# Patient Record
Sex: MALE | Race: BLACK OR AFRICAN AMERICAN | NOT HISPANIC OR LATINO | Employment: OTHER | URBAN - METROPOLITAN AREA
[De-identification: names, ages, dates, MRNs, and addresses within clinical notes are randomized per-mention and may not be internally consistent; named-entity substitution may affect disease eponyms.]

---

## 2020-01-18 ENCOUNTER — APPOINTMENT (INPATIENT)
Dept: RADIOLOGY | Facility: HOSPITAL | Age: 70
DRG: 305 | End: 2020-01-18
Payer: COMMERCIAL

## 2020-01-18 ENCOUNTER — HOSPITAL ENCOUNTER (EMERGENCY)
Facility: HOSPITAL | Age: 70
End: 2020-01-18
Attending: EMERGENCY MEDICINE
Payer: COMMERCIAL

## 2020-01-18 ENCOUNTER — HOSPITAL ENCOUNTER (INPATIENT)
Facility: HOSPITAL | Age: 70
LOS: 1 days | Discharge: HOME | DRG: 305 | End: 2020-01-19
Attending: INTERNAL MEDICINE | Admitting: GENERAL PRACTICE
Payer: COMMERCIAL

## 2020-01-18 VITALS
HEART RATE: 57 BPM | SYSTOLIC BLOOD PRESSURE: 175 MMHG | OXYGEN SATURATION: 98 % | WEIGHT: 251.77 LBS | TEMPERATURE: 97.7 F | DIASTOLIC BLOOD PRESSURE: 93 MMHG | RESPIRATION RATE: 17 BRPM

## 2020-01-18 DIAGNOSIS — J45.40 MODERATE PERSISTENT ASTHMA WITHOUT COMPLICATION: ICD-10-CM

## 2020-01-18 DIAGNOSIS — L30.9 ECZEMA, UNSPECIFIED TYPE: ICD-10-CM

## 2020-01-18 DIAGNOSIS — E11.9 DIABETES (HCC): ICD-10-CM

## 2020-01-18 DIAGNOSIS — N28.9 RENAL INSUFFICIENCY: ICD-10-CM

## 2020-01-18 DIAGNOSIS — M25.511 ACUTE PAIN OF RIGHT SHOULDER: ICD-10-CM

## 2020-01-18 DIAGNOSIS — I16.0 HYPERTENSIVE URGENCY: ICD-10-CM

## 2020-01-18 DIAGNOSIS — N18.30 CKD (CHRONIC KIDNEY DISEASE) STAGE 3, GFR 30-59 ML/MIN (HCC): ICD-10-CM

## 2020-01-18 DIAGNOSIS — R04.0 EPISTAXIS: Primary | ICD-10-CM

## 2020-01-18 DIAGNOSIS — I10 HYPERTENSION: ICD-10-CM

## 2020-01-18 DIAGNOSIS — E78.5 HYPERLIPIDEMIA: ICD-10-CM

## 2020-01-18 DIAGNOSIS — Z72.0 TOBACCO ABUSE: ICD-10-CM

## 2020-01-18 DIAGNOSIS — R04.0 POSTERIOR EPISTAXIS: ICD-10-CM

## 2020-01-18 DIAGNOSIS — R04.0 LEFT-SIDED EPISTAXIS: Primary | ICD-10-CM

## 2020-01-18 PROBLEM — J45.909 ASTHMA: Status: ACTIVE | Noted: 2020-01-18

## 2020-01-18 LAB
ABO GROUP BLD: NORMAL
ALBUMIN SERPL BCP-MCNC: 3.3 G/DL (ref 3.5–5)
ALP SERPL-CCNC: 77 U/L (ref 46–116)
ALT SERPL W P-5'-P-CCNC: 22 U/L (ref 12–78)
ANION GAP SERPL CALCULATED.3IONS-SCNC: 11 MMOL/L (ref 4–13)
APTT PPP: 35 SECONDS (ref 23–37)
AST SERPL W P-5'-P-CCNC: 24 U/L (ref 5–45)
BASOPHILS # BLD AUTO: 0.04 THOUSANDS/ΜL (ref 0–0.1)
BASOPHILS NFR BLD AUTO: 1 % (ref 0–1)
BILIRUB SERPL-MCNC: 0.3 MG/DL (ref 0.2–1)
BLD GP AB SCN SERPL QL: NEGATIVE
BUN SERPL-MCNC: 24 MG/DL (ref 5–25)
CALCIUM SERPL-MCNC: 8.5 MG/DL (ref 8.3–10.1)
CHLORIDE SERPL-SCNC: 107 MMOL/L (ref 100–108)
CO2 SERPL-SCNC: 24 MMOL/L (ref 21–32)
CREAT SERPL-MCNC: 1.77 MG/DL (ref 0.6–1.3)
EOSINOPHIL # BLD AUTO: 0.17 THOUSAND/ΜL (ref 0–0.61)
EOSINOPHIL NFR BLD AUTO: 4 % (ref 0–6)
ERYTHROCYTE [DISTWIDTH] IN BLOOD BY AUTOMATED COUNT: 13.6 % (ref 11.6–15.1)
GFR SERPL CREATININE-BSD FRML MDRD: 45 ML/MIN/1.73SQ M
GLUCOSE SERPL-MCNC: 109 MG/DL (ref 65–140)
GLUCOSE SERPL-MCNC: 117 MG/DL (ref 65–140)
GLUCOSE SERPL-MCNC: 83 MG/DL (ref 65–140)
HCT VFR BLD AUTO: 36.2 % (ref 36.5–49.3)
HGB BLD-MCNC: 11.7 G/DL (ref 12–17)
IMM GRANULOCYTES # BLD AUTO: 0.01 THOUSAND/UL (ref 0–0.2)
IMM GRANULOCYTES NFR BLD AUTO: 0 % (ref 0–2)
INR PPP: 1.16 (ref 0.84–1.19)
LYMPHOCYTES # BLD AUTO: 1.11 THOUSANDS/ΜL (ref 0.6–4.47)
LYMPHOCYTES NFR BLD AUTO: 23 % (ref 14–44)
MCH RBC QN AUTO: 27.9 PG (ref 26.8–34.3)
MCHC RBC AUTO-ENTMCNC: 32.3 G/DL (ref 31.4–37.4)
MCV RBC AUTO: 86 FL (ref 82–98)
MONOCYTES # BLD AUTO: 0.42 THOUSAND/ΜL (ref 0.17–1.22)
MONOCYTES NFR BLD AUTO: 9 % (ref 4–12)
NEUTROPHILS # BLD AUTO: 3.14 THOUSANDS/ΜL (ref 1.85–7.62)
NEUTS SEG NFR BLD AUTO: 63 % (ref 43–75)
NRBC BLD AUTO-RTO: 0 /100 WBCS
PLATELET # BLD AUTO: 246 THOUSANDS/UL (ref 149–390)
PMV BLD AUTO: 9.8 FL (ref 8.9–12.7)
POTASSIUM SERPL-SCNC: 3.8 MMOL/L (ref 3.5–5.3)
PROT SERPL-MCNC: 7.1 G/DL (ref 6.4–8.2)
PROTHROMBIN TIME: 14.9 SECONDS (ref 11.6–14.5)
RBC # BLD AUTO: 4.2 MILLION/UL (ref 3.88–5.62)
RH BLD: POSITIVE
SODIUM SERPL-SCNC: 142 MMOL/L (ref 136–145)
SPECIMEN EXPIRATION DATE: NORMAL
WBC # BLD AUTO: 4.89 THOUSAND/UL (ref 4.31–10.16)

## 2020-01-18 PROCEDURE — 99222 1ST HOSP IP/OBS MODERATE 55: CPT | Performed by: GENERAL PRACTICE

## 2020-01-18 PROCEDURE — 96375 TX/PRO/DX INJ NEW DRUG ADDON: CPT

## 2020-01-18 PROCEDURE — 96365 THER/PROPH/DIAG IV INF INIT: CPT

## 2020-01-18 PROCEDURE — 30903 CONTROL OF NOSEBLEED: CPT | Performed by: EMERGENCY MEDICINE

## 2020-01-18 PROCEDURE — 85610 PROTHROMBIN TIME: CPT | Performed by: EMERGENCY MEDICINE

## 2020-01-18 PROCEDURE — 73030 X-RAY EXAM OF SHOULDER: CPT

## 2020-01-18 PROCEDURE — 36415 COLL VENOUS BLD VENIPUNCTURE: CPT | Performed by: EMERGENCY MEDICINE

## 2020-01-18 PROCEDURE — 85730 THROMBOPLASTIN TIME PARTIAL: CPT | Performed by: EMERGENCY MEDICINE

## 2020-01-18 PROCEDURE — 94760 N-INVAS EAR/PLS OXIMETRY 1: CPT

## 2020-01-18 PROCEDURE — 80053 COMPREHEN METABOLIC PANEL: CPT | Performed by: EMERGENCY MEDICINE

## 2020-01-18 PROCEDURE — 99285 EMERGENCY DEPT VISIT HI MDM: CPT | Performed by: EMERGENCY MEDICINE

## 2020-01-18 PROCEDURE — 86901 BLOOD TYPING SEROLOGIC RH(D): CPT | Performed by: EMERGENCY MEDICINE

## 2020-01-18 PROCEDURE — 99284 EMERGENCY DEPT VISIT MOD MDM: CPT

## 2020-01-18 PROCEDURE — 86850 RBC ANTIBODY SCREEN: CPT | Performed by: EMERGENCY MEDICINE

## 2020-01-18 PROCEDURE — 86900 BLOOD TYPING SEROLOGIC ABO: CPT | Performed by: EMERGENCY MEDICINE

## 2020-01-18 PROCEDURE — 82948 REAGENT STRIP/BLOOD GLUCOSE: CPT

## 2020-01-18 PROCEDURE — 96361 HYDRATE IV INFUSION ADD-ON: CPT

## 2020-01-18 PROCEDURE — 85025 COMPLETE CBC W/AUTO DIFF WBC: CPT | Performed by: EMERGENCY MEDICINE

## 2020-01-18 RX ORDER — BUPROPION HYDROCHLORIDE 150 MG/1
150 TABLET, EXTENDED RELEASE ORAL 2 TIMES DAILY
COMMUNITY

## 2020-01-18 RX ORDER — BUPROPION HYDROCHLORIDE 150 MG/1
150 TABLET, EXTENDED RELEASE ORAL 2 TIMES DAILY
Status: DISCONTINUED | OUTPATIENT
Start: 2020-01-18 | End: 2020-01-19 | Stop reason: HOSPADM

## 2020-01-18 RX ORDER — AMOXICILLIN AND CLAVULANATE POTASSIUM 875; 125 MG/1; MG/1
1 TABLET, FILM COATED ORAL EVERY 12 HOURS SCHEDULED
Status: DISCONTINUED | OUTPATIENT
Start: 2020-01-18 | End: 2020-01-19 | Stop reason: HOSPADM

## 2020-01-18 RX ORDER — FLUTICASONE PROPIONATE 50 MCG
2 SPRAY, SUSPENSION (ML) NASAL DAILY
Status: DISCONTINUED | OUTPATIENT
Start: 2020-01-19 | End: 2020-01-19 | Stop reason: HOSPADM

## 2020-01-18 RX ORDER — TADALAFIL 20 MG/1
20 TABLET ORAL DAILY PRN
COMMUNITY

## 2020-01-18 RX ORDER — FLUTICASONE PROPIONATE 50 MCG
2 SPRAY, SUSPENSION (ML) NASAL DAILY
Status: DISCONTINUED | OUTPATIENT
Start: 2020-01-19 | End: 2020-01-18

## 2020-01-18 RX ORDER — CLONIDINE HYDROCHLORIDE 0.2 MG/1
0.2 TABLET ORAL EVERY 12 HOURS SCHEDULED
Status: DISCONTINUED | OUTPATIENT
Start: 2020-01-18 | End: 2020-01-19 | Stop reason: HOSPADM

## 2020-01-18 RX ORDER — PREDNISONE 20 MG/1
20 TABLET ORAL SEE ADMIN INSTRUCTIONS
COMMUNITY

## 2020-01-18 RX ORDER — HYDROCHLOROTHIAZIDE 25 MG/1
25 TABLET ORAL DAILY
Status: ON HOLD | COMMUNITY
Start: 2019-08-21 | End: 2020-01-19 | Stop reason: SDUPTHER

## 2020-01-18 RX ORDER — MOMETASONE FUROATE 50 UG/1
2 SPRAY, METERED NASAL DAILY
COMMUNITY

## 2020-01-18 RX ORDER — OXYCODONE HYDROCHLORIDE 5 MG/1
2.5 TABLET ORAL EVERY 4 HOURS PRN
Status: DISCONTINUED | OUTPATIENT
Start: 2020-01-18 | End: 2020-01-19 | Stop reason: HOSPADM

## 2020-01-18 RX ORDER — TRIAMCINOLONE ACETONIDE 1 MG/ML
LOTION TOPICAL 3 TIMES DAILY
COMMUNITY

## 2020-01-18 RX ORDER — PANTOPRAZOLE SODIUM 20 MG/1
20 TABLET, DELAYED RELEASE ORAL
Status: DISCONTINUED | OUTPATIENT
Start: 2020-01-18 | End: 2020-01-19 | Stop reason: HOSPADM

## 2020-01-18 RX ORDER — OMEPRAZOLE 20 MG/1
20 CAPSULE, DELAYED RELEASE ORAL 2 TIMES DAILY
COMMUNITY

## 2020-01-18 RX ORDER — ALBUTEROL SULFATE 90 UG/1
2 AEROSOL, METERED RESPIRATORY (INHALATION) 4 TIMES DAILY
COMMUNITY

## 2020-01-18 RX ORDER — CEFAZOLIN SODIUM 2 G/50ML
2000 SOLUTION INTRAVENOUS ONCE
Status: COMPLETED | OUTPATIENT
Start: 2020-01-18 | End: 2020-01-18

## 2020-01-18 RX ORDER — FLUTICASONE FUROATE AND VILANTEROL 200; 25 UG/1; UG/1
1 POWDER RESPIRATORY (INHALATION)
Status: DISCONTINUED | OUTPATIENT
Start: 2020-01-19 | End: 2020-01-19 | Stop reason: HOSPADM

## 2020-01-18 RX ORDER — BACITRACIN, NEOMYCIN, POLYMYXIN B 400; 3.5; 5 [USP'U]/G; MG/G; [USP'U]/G
1 OINTMENT TOPICAL ONCE
Status: COMPLETED | OUTPATIENT
Start: 2020-01-18 | End: 2020-01-18

## 2020-01-18 RX ORDER — SIMVASTATIN 20 MG
20 TABLET ORAL
COMMUNITY
Start: 2019-08-21

## 2020-01-18 RX ORDER — MONTELUKAST SODIUM 10 MG/1
10 TABLET ORAL
Status: DISCONTINUED | OUTPATIENT
Start: 2020-01-18 | End: 2020-01-19 | Stop reason: HOSPADM

## 2020-01-18 RX ORDER — POTASSIUM CHLORIDE 750 MG/1
10 TABLET, EXTENDED RELEASE ORAL DAILY
Status: DISCONTINUED | OUTPATIENT
Start: 2020-01-18 | End: 2020-01-19 | Stop reason: HOSPADM

## 2020-01-18 RX ORDER — OXYMETAZOLINE HYDROCHLORIDE 0.05 G/100ML
2 SPRAY NASAL ONCE
Status: DISCONTINUED | OUTPATIENT
Start: 2020-01-18 | End: 2020-01-18

## 2020-01-18 RX ORDER — ONDANSETRON 2 MG/ML
4 INJECTION INTRAMUSCULAR; INTRAVENOUS ONCE
Status: COMPLETED | OUTPATIENT
Start: 2020-01-18 | End: 2020-01-18

## 2020-01-18 RX ORDER — PRAVASTATIN SODIUM 40 MG
40 TABLET ORAL
Status: DISCONTINUED | OUTPATIENT
Start: 2020-01-18 | End: 2020-01-19 | Stop reason: HOSPADM

## 2020-01-18 RX ORDER — METHOCARBAMOL 500 MG/1
500 TABLET, FILM COATED ORAL EVERY 6 HOURS SCHEDULED
Status: DISCONTINUED | OUTPATIENT
Start: 2020-01-18 | End: 2020-01-19 | Stop reason: HOSPADM

## 2020-01-18 RX ORDER — POTASSIUM CHLORIDE 750 MG/1
10 TABLET, EXTENDED RELEASE ORAL DAILY
COMMUNITY

## 2020-01-18 RX ORDER — CLONIDINE HYDROCHLORIDE 0.2 MG/1
0.2 TABLET ORAL EVERY 12 HOURS SCHEDULED
Status: DISCONTINUED | OUTPATIENT
Start: 2020-01-18 | End: 2020-01-18

## 2020-01-18 RX ORDER — CLOBETASOL PROPIONATE 0.5 MG/G
CREAM TOPICAL DAILY PRN
Status: DISCONTINUED | OUTPATIENT
Start: 2020-01-18 | End: 2020-01-19 | Stop reason: HOSPADM

## 2020-01-18 RX ORDER — VARENICLINE TARTRATE 0.5 MG/1
0.5 TABLET, FILM COATED ORAL 2 TIMES DAILY
Status: DISCONTINUED | OUTPATIENT
Start: 2020-01-18 | End: 2020-01-18

## 2020-01-18 RX ORDER — OXYMETAZOLINE HYDROCHLORIDE 0.05 G/100ML
2 SPRAY NASAL ONCE
Status: COMPLETED | OUTPATIENT
Start: 2020-01-18 | End: 2020-01-18

## 2020-01-18 RX ORDER — ALBUTEROL SULFATE 90 UG/1
2 AEROSOL, METERED RESPIRATORY (INHALATION) EVERY 4 HOURS PRN
Status: DISCONTINUED | OUTPATIENT
Start: 2020-01-18 | End: 2020-01-19 | Stop reason: HOSPADM

## 2020-01-18 RX ORDER — AMLODIPINE BESYLATE 10 MG/1
10 TABLET ORAL DAILY
COMMUNITY
Start: 2019-08-21

## 2020-01-18 RX ORDER — GABAPENTIN 300 MG/1
300 CAPSULE ORAL 2 TIMES DAILY
Status: DISCONTINUED | OUTPATIENT
Start: 2020-01-18 | End: 2020-01-19 | Stop reason: HOSPADM

## 2020-01-18 RX ORDER — ACETAMINOPHEN 325 MG/1
975 TABLET ORAL EVERY 8 HOURS SCHEDULED
Status: DISCONTINUED | OUTPATIENT
Start: 2020-01-18 | End: 2020-01-19 | Stop reason: HOSPADM

## 2020-01-18 RX ORDER — VARENICLINE TARTRATE 1 MG/1
0.5 TABLET, FILM COATED ORAL 2 TIMES DAILY
COMMUNITY
Start: 2018-11-28

## 2020-01-18 RX ORDER — AMLODIPINE BESYLATE 10 MG/1
10 TABLET ORAL DAILY
Status: DISCONTINUED | OUTPATIENT
Start: 2020-01-18 | End: 2020-01-19 | Stop reason: HOSPADM

## 2020-01-18 RX ORDER — IPRATROPIUM BROMIDE AND ALBUTEROL SULFATE 2.5; .5 MG/3ML; MG/3ML
3 SOLUTION RESPIRATORY (INHALATION) 4 TIMES DAILY
COMMUNITY

## 2020-01-18 RX ORDER — LABETALOL 20 MG/4 ML (5 MG/ML) INTRAVENOUS SYRINGE
20 EVERY 4 HOURS PRN
Status: DISCONTINUED | OUTPATIENT
Start: 2020-01-18 | End: 2020-01-19 | Stop reason: HOSPADM

## 2020-01-18 RX ORDER — CLOBETASOL PROPIONATE 0.5 MG/G
OINTMENT TOPICAL 2 TIMES DAILY
COMMUNITY

## 2020-01-18 RX ORDER — LOSARTAN POTASSIUM 50 MG/1
100 TABLET ORAL DAILY
Status: DISCONTINUED | OUTPATIENT
Start: 2020-01-18 | End: 2020-01-19 | Stop reason: HOSPADM

## 2020-01-18 RX ORDER — ZAFIRLUKAST 20 MG/1
20 TABLET, FILM COATED ORAL 2 TIMES DAILY
COMMUNITY

## 2020-01-18 RX ORDER — GABAPENTIN 300 MG/1
300 CAPSULE ORAL 2 TIMES DAILY
COMMUNITY

## 2020-01-18 RX ORDER — CLONIDINE HYDROCHLORIDE 0.1 MG/1
0.2 TABLET ORAL ONCE
Status: COMPLETED | OUTPATIENT
Start: 2020-01-18 | End: 2020-01-18

## 2020-01-18 RX ORDER — CLONIDINE HYDROCHLORIDE 0.2 MG/1
0.2 TABLET ORAL DAILY
COMMUNITY
Start: 2019-08-21

## 2020-01-18 RX ORDER — HYDROCHLOROTHIAZIDE 25 MG/1
25 TABLET ORAL DAILY
Status: DISCONTINUED | OUTPATIENT
Start: 2020-01-18 | End: 2020-01-19 | Stop reason: HOSPADM

## 2020-01-18 RX ORDER — LOSARTAN POTASSIUM 100 MG/1
100 TABLET ORAL DAILY
COMMUNITY
Start: 2019-08-21

## 2020-01-18 RX ORDER — IPRATROPIUM BROMIDE AND ALBUTEROL SULFATE 2.5; .5 MG/3ML; MG/3ML
3 SOLUTION RESPIRATORY (INHALATION) 4 TIMES DAILY PRN
Status: DISCONTINUED | OUTPATIENT
Start: 2020-01-18 | End: 2020-01-18

## 2020-01-18 RX ORDER — FLUTICASONE PROPIONATE 110 UG/1
2 AEROSOL, METERED RESPIRATORY (INHALATION) 2 TIMES DAILY
COMMUNITY

## 2020-01-18 RX ADMIN — METHOCARBAMOL TABLETS 500 MG: 500 TABLET, COATED ORAL at 17:29

## 2020-01-18 RX ADMIN — DICLOFENAC 2 G: 10 GEL TOPICAL at 17:38

## 2020-01-18 RX ADMIN — PRAVASTATIN SODIUM 40 MG: 40 TABLET ORAL at 17:30

## 2020-01-18 RX ADMIN — ACETAMINOPHEN 975 MG: 325 TABLET ORAL at 22:32

## 2020-01-18 RX ADMIN — AMLODIPINE BESYLATE 10 MG: 10 TABLET ORAL at 17:30

## 2020-01-18 RX ADMIN — GABAPENTIN 300 MG: 300 CAPSULE ORAL at 17:29

## 2020-01-18 RX ADMIN — CLONIDINE HYDROCHLORIDE 0.2 MG: 0.2 TABLET ORAL at 22:32

## 2020-01-18 RX ADMIN — POTASSIUM CHLORIDE 10 MEQ: 750 TABLET, EXTENDED RELEASE ORAL at 17:30

## 2020-01-18 RX ADMIN — METHOCARBAMOL TABLETS 500 MG: 500 TABLET, COATED ORAL at 22:33

## 2020-01-18 RX ADMIN — OXYMETAZOLINE HYDROCHLORIDE 2 SPRAY: 5 SPRAY NASAL at 07:20

## 2020-01-18 RX ADMIN — DICLOFENAC 2 G: 10 GEL TOPICAL at 22:33

## 2020-01-18 RX ADMIN — BACITRACIN, NEOMYCIN, POLYMYXIN B 1 SMALL APPLICATION: 400; 3.5; 5 OINTMENT TOPICAL at 07:21

## 2020-01-18 RX ADMIN — BUPROPION HYDROCHLORIDE 150 MG: 150 TABLET, EXTENDED RELEASE ORAL at 17:38

## 2020-01-18 RX ADMIN — PANTOPRAZOLE SODIUM 20 MG: 20 TABLET, DELAYED RELEASE ORAL at 17:30

## 2020-01-18 RX ADMIN — MORPHINE SULFATE 2 MG: 2 INJECTION, SOLUTION INTRAMUSCULAR; INTRAVENOUS at 08:05

## 2020-01-18 RX ADMIN — HYDROCHLOROTHIAZIDE 25 MG: 25 TABLET ORAL at 17:29

## 2020-01-18 RX ADMIN — AMOXICILLIN AND CLAVULANATE POTASSIUM 1 TABLET: 875; 125 TABLET, FILM COATED ORAL at 17:38

## 2020-01-18 RX ADMIN — SODIUM CHLORIDE 1000 ML: 0.9 INJECTION, SOLUTION INTRAVENOUS at 08:02

## 2020-01-18 RX ADMIN — CLONIDINE HYDROCHLORIDE 0.2 MG: 0.1 TABLET ORAL at 09:04

## 2020-01-18 RX ADMIN — LOSARTAN POTASSIUM 100 MG: 50 TABLET, FILM COATED ORAL at 17:29

## 2020-01-18 RX ADMIN — ONDANSETRON 4 MG: 2 INJECTION INTRAMUSCULAR; INTRAVENOUS at 08:05

## 2020-01-18 RX ADMIN — CEFAZOLIN SODIUM 2000 MG: 2 SOLUTION INTRAVENOUS at 08:55

## 2020-01-18 RX ADMIN — MONTELUKAST 10 MG: 10 TABLET, FILM COATED ORAL at 22:32

## 2020-01-18 NOTE — QUICK NOTE
Discussed pt with the ED team  They will transfer for ongoing BP management  Will eval pt after arrival at [place] in the AM or sooner should there be further concerns

## 2020-01-18 NOTE — PLAN OF CARE
Problem: Nutrition/Hydration-ADULT  Goal: Nutrient/Hydration intake appropriate for improving, restoring or maintaining nutritional needs  Description  Monitor and assess patient's nutrition/hydration status for malnutrition  Collaborate with interdisciplinary team and initiate plan and interventions as ordered  Monitor patient's weight and dietary intake as ordered or per policy  Utilize nutrition screening tool and intervene as necessary  Determine patient's food preferences and provide high-protein, high-caloric foods as appropriate  INTERVENTIONS:  - Monitor oral intake, urinary output, labs, and treatment plans  - Assess nutrition and hydration status and recommend course of action  - Evaluate amount of meals eaten  - Assist patient with eating if necessary   - Allow adequate time for meals  - Recommend/ encourage appropriate diets, oral nutritional supplements, and vitamin/mineral supplements  - Order, calculate, and assess calorie counts as needed  - Recommend, monitor, and adjust tube feedings and TPN/PPN based on assessed needs  - Assess need for intravenous fluids  - Provide specific nutrition/hydration education as appropriate  - Include patient/family/caregiver in decisions related to nutrition  Outcome: Progressing     Problem: Knowledge Deficit  Goal: Patient/family/caregiver demonstrates understanding of disease process, treatment plan, medications, and discharge instructions  Description  Complete learning assessment and assess knowledge base    Interventions:  - Provide teaching at level of understanding  - Provide teaching via preferred learning methods  Outcome: Progressing     Problem: HEMATOLOGIC - ADULT  Goal: Maintains hematologic stability  Description  INTERVENTIONS  - Assess for signs and symptoms of bleeding or hemorrhage  - Monitor labs  - Administer supportive blood products/factors as ordered and appropriate  Outcome: Progressing

## 2020-01-18 NOTE — ED NOTES
Pt provided with lunch tray and sitting at side of bed to eat  Call bell within reach  Family at bedside       Erick Lal RN  01/18/20 1002

## 2020-01-18 NOTE — ASSESSMENT & PLAN NOTE
Pt did not take his meds today - will give home meds now  Make clonidine bid - this can always be increased to 0 3 mg bid  He can also be started on Coreg to help pt achieve better BP control  Goal SBP for this 69M is < 150, but under 170 can be tolerated  Labetalol prn SBP > 170 1h after giving home meds  If recheck 20 min after labetalol still high, will give another dose of labetalol and recheck in 20 min  If after 3 doses BP does not respond to labetalol, would talk to ICU about starting cardene GTT in setting of epistaxis

## 2020-01-18 NOTE — RESPIRATORY THERAPY NOTE
RT Protocol Note  Radha Garcia 77 y o  male MRN: [id number]  Unit/Bed#: -01 Encounter: [id number]    Assessment    Principal Problem:    Epistaxis  Active Problems:    Hypertensive urgency    Acute pain of right shoulder    Tobacco abuse    Asthma    CKD (chronic kidney disease) stage 3, GFR 30-59 ml/min ([facility])    Eczema      Home Pulmonary Medications:  Albuterol MDI/UDN and Breo       Past Medical History:   Diagnosis Date    Asthma     [facility]ch[facility] obstructive [facility] disease) ([facility] )     Diabetes mellitus ([place] [address] )     Hyperlipidemia     Hypertension      Social History     Socioeconomic History    Marital status:      Spouse name: None    Number of children: None    Years of education: None    Highest education level: None   Occupational History    None   Social Needs    Financial resource strain: None    Food insecurity:     Worry: None     Inability: None    Transportation needs:     Medical: None     Non-medical: None   Tobacco Use    Smoking status: Current Every Day Smoker     Types: Cigars    Smokeless tobacco: Never Used    Tobacco comment: 3 cigars    Substance and Sexual Activity    Alcohol use: Yes     Frequency: Monthly or less     Comment: social    Drug use: Never    Sexual activity: Not Currently   Lifestyle    Physical activity:     Days per week: None     Minutes per session: None    Stress: None   Relationships    Social connections:     Talks on phone: None     Gets together: None     Attends [employer] service: None     Active member of club or organization: None     Attends meetings of clubs or organizations: None     Relationship status: None    Intimate partner violence:     Fear of current or ex partner: None     Emotionally abused: None     Physically abused: None     Forced sexual activity: None   Other Topics Concern    None   Social History Narrative    None       Subjective         Objective    Physical Exam:   Assessment Type: Assess only  General Appearance: Alert, Awake  Respiratory Pattern: Normal  Chest Assessment: Chest expansion symmetrical  Bilateral Breath Sounds: Clear  Cough: Non-productive    Vitals:  Blood pressure (!) 172/92, pulse 63, temperature 98 °F (36 7 °C), temperature source Oral, resp  rate 18, SpO2 98 %  Imaging and other studies: I have personally reviewed pertinent reports  Plan    Respiratory Plan: Home Bronchodilator Patient pathway, Discontinue Protocol        Resp Comments: Pt was evaluated per resp  protocol and during discussion with pt he clarified the he uses both Albuterol MDI and UDN at home for sob and wheeze  He prefers MDI and says he hasn't used UDN for roughly 6months, only uses it for extreme sob  Will continue Albuterol MDI orders and discontinue UDN orders and resp  protocol

## 2020-01-18 NOTE — H&P
H&P- Power Elkins 1950, 77 y o  male MRN: [id number]    Unit/Bed#: MS [facility] Encounter: [id number]    Primary Care Provider: Gayathri Live MD   Date and time admitted to hospital: 1/18/2020  4:01 PM        * Epistaxis  Assessment & Plan  Packed in ER  No active bleeding  Check CBC tomorrow - expect drop in Hgb  Likely related to HTN and pt taking 1950 mg ASA/day for right shoulder pain  Augmentin while packing in  ENT consult    Hypertensive urgency  Assessment & Plan  Pt did not take his meds today - will give home meds now  Make clonidine bid - this can always be increased to 0 3 mg bid  He can also be started on Hydral or Aldactone to help pt achieve better BP control  Goal SBP for this 69M is < 150, but under 170 can be tolerated  Labetalol prn SBP > 170 1h after giving home meds  If recheck 20 min after labetalol still high, will give another dose of labetalol and recheck in 20 min  If after 3 doses BP does not respond to labetalol, would talk to ICU about starting cardene GTT in setting of epistaxis  Eczema  Assessment & Plan  Clobetasol prn    CKD (chronic kidney disease) stage 3, GFR 30-59 ml/min ([facility])  Assessment & Plan  Cr at baseline 1 7  In setting of uncontrolled BP and CKD3, rec outpt nephro referral at d/c    Asthma  Assessment & Plan  Resp protocol  No exac, so ok for labetalol prn    Tobacco abuse  Assessment & Plan  Wellbutrin  Pt is not taking Chantix at this time  Refuses patch    Acute pain of right shoulder  Assessment & Plan  Xray to check for fracture, which is unlikely  Suspect rotator cuff injury - will consult PT  Pain control w/ tylenol and Robaxin RTC  Oxy 2 5 prn  Voltaren topl  Avoid PO NSAIDS and ASA      VTE Prophylaxis: Pharmacologic VTE Prophylaxis contraindicated due to epistaxis  / sequential compression device   Code Status: Full  POLST: POLST form is not discussed and not completed at this time    Discussion with family: no    Anticipated Length of Stay:  Patient will be admitted on an Inpatient basis with an anticipated length of stay of  At least 2 midnights  Justification for Hospital Stay: need to get BP better controlled and monitor for further epistaxis    Total Time for Visit, including Counseling / Coordination of Care: 45 minutes  Greater than 50% of this total time spent on direct patient counseling and coordination of care  Chief Complaint:   Nose bleed    History of Present Illness:    Power Freire is a 77 y o  male w/ hx resistant HTN who presents with epistaxis starting at 430 AM today  PT had nasal congestion and went to blow nose and started bleeding  Held pressure and it would not stop so he presented to ER  Er tried Afrin and it did not help  ER placed packing and epistaxis resolved  Pt also c/o 5 days of right shoulder pain - he hurt ut playing with his dog  Says shoulder hurts with movement  Was taking  mg tid for relief  Also took ibuprofen once for relief  Pt says he felt febnile about a week ago but not currently  Eating and drinking well  Denies taking any of his meds this AM   He was given clonidine 0 2 mg in ER, although he usually only takes this at night  Review of Systems:    Review of Systems   Constitutional: Negative  HENT: Positive for nosebleeds, postnasal drip and rhinorrhea  Eyes: Negative  Respiratory: Negative  Cardiovascular: Negative  Gastrointestinal: Negative  Endocrine: Negative  Genitourinary: Negative  Musculoskeletal: Positive for arthralgias  Skin: Negative  Allergic/Immunologic: Negative  Neurological: Negative  Hematological: Negative  Psychiatric/Behavioral: Negative          Past Medical and Surgical History:     Past Medical History:   Diagnosis Date    Asthma     [facility]chronic obstructive [facility] disease) ([facility] )     Diabetes mellitus ([facility] )     Hyperlipidemia     Hypertension        Past Surgical History:   Procedure Laterality Date    SHOULDER ARTHROPLASTY Right     TOTAL HIP ARTHROPLASTY Right        Meds/Allergies:    Prior to Admission medications    Medication Sig Start Date End Date Taking? Authorizing Provider   albuterol (PROVENTIL HFA,VENTOLIN HFA) 90 mcg/act inhaler Inhale 2 puffs 4 (four) times a day    Historical Provider, MD   amLODIPine (NORVASC) 10 mg tablet Take 10 mg by mouth daily 8/21/19   Historical Provider, MD   buPROPion [facility] SR) 150 mg 12 hr tablet Take 150 mg by mouth 2 (two) times a day    Historical Provider, MD   clobetasol (TEMOVATE) 0 05 % ointment Apply topically 2 (two) times a day    Historical Provider, MD   cloNIDine (CATAPRES) 0 2 mg tablet Take 0 2 mg by mouth daily 8/21/19   Historical Provider, MD   diclofenac sodium (VOLTAREN) 1 % Apply 2 g topically 4 (four) times a day    Historical Provider, MD   fluticasone (FLOVENT HFA) 110 MCG/ACT inhaler Inhale 2 puffs 2 (two) times a day Rinse mouth after use  Historical Provider, MD   fluticasone-salmeterol (ADVAIR, WIXELA) 500-50 mcg/dose inhaler Inhale 1 puff 2 (two) times a day Rinse mouth after use      Historical Provider, MD   gabapentin (NEURONTIN) 300 mg capsule Take 300 mg by mouth 2 (two) times a day    Historical Provider, MD   hydrochlorothiazide (HYDRODIURIL) 25 mg tablet Take 25 mg by mouth daily 8/21/19   Historical Provider, MD   ipratropium-albuterol (DUO-NEB) 0 5-2 5 mg/3 mL nebulizer solution Take 3 mL by nebulization 4 (four) times a day    Historical Provider, MD   losartan (COZAAR) 100 MG tablet Take 100 mg by mouth daily 8/21/19   Historical Provider, MD   mometasone (NASONEX) 50 mcg/act nasal spray 2 sprays into each nostril daily    Historical Provider, MD   omeprazole (PriLOSEC) 20 mg delayed release capsule Take 20 mg by mouth 2 (two) times a day    Historical Provider, MD   potassium chloride (K-DUR,KLOR-CON) 10 mEq tablet Take 10 mEq by mouth daily    Historical Provider, MD   predniSONE 20 mg tablet Take 20 mg by mouth see administration instructions As directed    Historical Provider, MD   simvastatin (ZOCOR) 20 mg tablet Take 20 mg by mouth 8/21/19   Historical Provider, MD   tadalafil (CIALIS) 20 MG tablet Take 20 mg by mouth daily as needed for erectile dysfunction    Historical Provider, MD   triamcinolone (KENALOG) 0 1 % lotion Apply topically 3 (three) times a day    Historical Provider, MD   varenicline (CHANTIX) 1 mg tablet Take 0 5 mg by mouth 2 (two) times a day 11/28/18   Historical Provider, MD   zafirlukast (ACCOLATE) 20 MG tablet Take 20 mg by mouth 2 (two) times a day    Historical Provider, MD     I have reviewed home medications with patient personally  Allergies: No Known Allergies    Social History:     Marital Status:      Substance Use History:   Social History     Substance and Sexual Activity   Alcohol Use Yes    Frequency: Monthly or less    Comment: social     Social History     Tobacco Use   Smoking Status Current Every Day Smoker    Types: Cigars   Smokeless Tobacco Never Used   Tobacco Comment    3 cigars      Social History     Substance and Sexual Activity   Drug Use Never       Family History:    History reviewed  No pertinent family history  Physical Exam:     Vitals:   Blood Pressure: (!) 172/92 (01/18/20 1600)  Pulse: 63 (01/18/20 1600)  Temperature: 98 °F (36 7 °C) (01/18/20 1600)  Temp Source: Oral (01/18/20 1600)  Respirations: 18 (01/18/20 1600)  SpO2: 98 % (01/18/20 1600)    Physical Exam   Constitutional: He is oriented to person, place, and time  No distress  HENT:   Head: Normocephalic and atraumatic  Nasal packing in place   Eyes: Conjunctivae and EOM are normal    Neck: Normal range of motion  Neck supple  Cardiovascular: Normal rate and regular rhythm  Pulmonary/Chest: Effort normal and breath sounds normal  No respiratory distress  He has no wheezes  He has no rales  Abdominal: Soft  Bowel sounds are normal  He exhibits no distension  There is no tenderness  Musculoskeletal: He exhibits no edema  Right bicep TTP  RUE decreased passive and active abduction   Neurological: He is alert and oriented to person, place, and time  Skin: Skin is warm and dry  He is not diaphoretic  Additional Data:     Lab Results: I have personally reviewed pertinent reports  Results from last 7 days   Lab Units 01/18/20  0755   WBC Thousand/uL 4 89   HEMOGLOBIN g/dL 11 7*   HEMATOCRIT % 36 2*   PLATELETS Thousands/uL 246   NEUTROS PCT % 63   LYMPHS PCT % 23   MONOS PCT % 9   EOS PCT % 4     Results from last 7 days   Lab Units 01/18/20  0755   SODIUM mmol/L 142   POTASSIUM mmol/L 3 8   CHLORIDE mmol/L 107   CO2 mmol/L 24   BUN mg/dL 24   CREATININE mg/dL 1 77*   ANION GAP mmol/L 11   CALCIUM mg/dL 8 5   ALBUMIN g/dL 3 3*   TOTAL BILIRUBIN mg/dL 0 30   ALK PHOS U/L 77   ALT U/L 22   AST U/L 24   GLUCOSE RANDOM mg/dL 109     Results from last 7 days   Lab Units 01/18/20  0755   INR  1 16     Results from last 7 days   Lab Units 01/18/20  1621 01/18/20  1118   POC GLUCOSE mg/dl 83 117               Imaging: I have personally reviewed pertinent reports  XR shoulder 2+ vw right    (Results Pending)       EKG, Pathology, and Other Studies Reviewed on Admission:   · EKG: n/a    Allscripts / Epic Records Reviewed: Yes     ** Please Note: This note has been constructed using a voice recognition system   **

## 2020-01-18 NOTE — ASSESSMENT & PLAN NOTE
Packed in ER  No active bleeding  Check CBC tomorrow - expect drop in Hgb  Likely related to HTN and pt taking 1950 mg ASA/day for right shoulder pain  Augmentin while packing in  ENT consult

## 2020-01-18 NOTE — EMTALA/ACUTE CARE TRANSFER
[facility] EMERGENCY DEPARTMENT  [address] [place] [id number]  Dept: [phone]      EMTALA TRANSFER CONSENT    NAME Michail Koyanagi                                         1950                              MRN [id number]    I have been informed of my rights regarding examination, treatment, and transfer   by Dr Trina Ugalde DO    Benefits: Specialized equipment and/or services available at the receiving facility (Include comment)________________________    Risks: Potential for delay in receiving treatment      Transfer Request   I acknowledge that my medical condition has been evaluated and explained to me by the emergency department physician or other qualified medical person and/or my attending physician who has recommended and offered to me further medical examination and treatment  I understand the Hospital's obligation with respect to the treatment and stabilization of my emergency medical condition  I nevertheless request to be transferred  I release the Hospital, the doctor, and any other persons caring for me from all responsibility or liability for any injury or ill effects that may result from my transfer and agree to accept all responsibility for the consequences of my choice to transfer, rather than receive stabilizing treatment at the Hospital  I understand that because the transfer is my request, my insurance may not provide reimbursement for the services  The Hospital will assist and direct me and my family in how to make arrangements for transfer, but the hospital is not liable for any fees charged by the transport service  In spite of this understanding, I refuse to consent to further medical examination and treatment which has been offered to me, and request transfer to    I authorize the performance of emergency medical procedures and treatments upon me in both transit and upon arrival at the receiving facility    Additionally, I authorize the release of any and all medical records to the receiving facility and request they be transported with me, if possible  I authorize the performance of emergency medical procedures and treatments upon me in both transit and upon arrival at the receiving facility  Additionally, I authorize the release of any and all medical records to the receiving facility and request they be transported with me, if possible  I understand that the safest mode of transportation during a medical emergency is an ambulance and that the Hospital advocates the use of this mode of transport  Risks of traveling to the receiving facility by car, including absence of medical control, life sustaining equipment, such as oxygen, and medical personnel has been explained to me and I fully understand them  (JULIETA CORRECT BOX BELOW)  [  ]  I consent to the stated transfer and to be transported by ambulance/helicopter  [  ]  I consent to the stated transfer, but refuse transportation by ambulance and accept full responsibility for my transportation by car  I understand the risks of non-ambulance transfers and I exonerate the Hospital and its staff from any deterioration in my condition that results from this refusal     X___________________________________________    DATE  20  TIME________  Signature of patient or legally responsible individual signing on patient behalf           RELATIONSHIP TO PATIENT_________________________          Provider Certification    NAME Ksenia Mccoy                                         1950                              MRN [id number]    A medical screening exam was performed on the above named patient  Based on the examination:    Condition Necessitating Transfer The primary encounter diagnosis was Left-sided epistaxis  Diagnoses of Posterior epistaxis, Hypertension, Hyperlipidemia, and Diabetes ([facility] ) were also pertinent to this visit      Patient Condition: The patient has been stabilized such that within reasonable medical probability, no material deterioration of the patient condition or the condition of the unborn child(qing) is likely to result from the transfer    Reason for Transfer: Level of Care needed not available at this facility    Transfer Requirements: Facility     · Space available and qualified personnel available for treatment as acknowledged by    · Agreed to accept transfer and to provide appropriate medical treatment as acknowledged by       DR WINDY MOYA VICKI [facility]   · Appropriate medical records of the examination and treatment of the patient are provided at the time of transfer   [facility], [address] _______  · Transfer will be performed by qualified personnel from    and appropriate transfer equipment as required, including the use of necessary and appropriate life support measures  Provider Certification: I have examined the patient and explained the following risks and benefits of being transferred/refusing transfer to the patient/family:  General risk, such as traffic hazards, adverse weather conditions, rough terrain or turbulence, possible failure of equipment (including vehicle or aircraft), or consequences of actions of persons outside the control of the transport personnel      Based on these reasonable risks and benefits to the patient and/or the unborn child(qing), and based upon the information available at the time of the patients examination, I certify that the medical benefits reasonably to be expected from the provision of appropriate medical treatments at another medical facility outweigh the increasing risks, if any, to the individuals medical condition, and in the case of labor to the unborn child, from effecting the transfer      X____________________________________________ DATE 01/18/20        TIME_______      ORIGINAL - SEND TO MEDICAL RECORDS   COPY - SEND WITH PATIENT DURING TRANSFER

## 2020-01-18 NOTE — ED PROVIDER NOTES
History  Chief Complaint   Patient presents with    Nose Bleed     Pt states he awoke with a nose bleed at 0500 this morning  Pt denies blood thinners  60-year-old male with a history of hypertension hyperlipidemia COPD presents complaining of blood from both nostrils  He states that he woke at 0500 hours blew his nose and is having bleeding from both nostrils  Patient states he has been having these nose bleeds intermittently for the past 3 months  He states he did see an ENT doctor and "cauterized his nose "  Patient states that he has had weekly nose bleeds which she is generally able to get under control on his own for the last 3 months  History provided by:  Patient  Nose Bleed   Location:  L nare  Severity:  Severe  Duration:  2 hours  Timing:  Constant  Context: not anticoagulants, not aspirin use, not BiPAP and not bleeding disorder    Relieved by:  Nothing  Worsened by:  Nothing  Ineffective treatments:  None tried  Associated symptoms: blood in oropharynx    Risk factors: frequent nosebleeds    Risk factors: no alcohol use, no allergies and no change in medication        Prior to Admission Medications   Prescriptions Last Dose Informant Patient Reported? Taking? albuterol (PROVENTIL HFA,VENTOLIN HFA) 90 mcg/act inhaler   Yes Yes   Sig: Inhale 2 puffs 4 (four) times a day   amLODIPine (NORVASC) 10 mg tablet   Yes Yes   Sig: Take 10 mg by mouth daily   buPROPion (WELLBUTRIN SR) 150 mg 12 hr tablet   Yes Yes   Sig: Take 150 mg by mouth 2 (two) times a day   cloNIDine (CATAPRES) 0 2 mg tablet   Yes Yes   Sig: Take 0 2 mg by mouth daily   clobetasol (TEMOVATE) 0 05 % ointment   Yes Yes   Sig: Apply topically 2 (two) times a day   diclofenac sodium (VOLTAREN) 1 %   Yes Yes   Sig: Apply 2 g topically 4 (four) times a day   fluticasone (FLOVENT HFA) 110 MCG/ACT inhaler   Yes Yes   Sig: Inhale 2 puffs 2 (two) times a day Rinse mouth after use     fluticasone-salmeterol (Bennye Situ) 500-50 mcg/dose inhaler   Yes Yes   Sig: Inhale 1 puff 2 (two) times a day Rinse mouth after use    gabapentin (NEURONTIN) 300 mg capsule   Yes Yes   Sig: Take 300 mg by mouth 2 (two) times a day   hydrochlorothiazide (HYDRODIURIL) 25 mg tablet   Yes Yes   Sig: Take 25 mg by mouth daily   ipratropium-albuterol (DUO-NEB) 0 5-2 5 mg/3 mL nebulizer solution   Yes Yes   Sig: Take 3 mL by nebulization 4 (four) times a day   losartan (COZAAR) 100 MG tablet   Yes Yes   Sig: Take 100 mg by mouth daily   mometasone (NASONEX) 50 mcg/act nasal spray   Yes Yes   Si sprays into each nostril daily   omeprazole (PriLOSEC) 20 mg delayed release capsule   Yes Yes   Sig: Take 20 mg by mouth 2 (two) times a day   potassium chloride (K-DUR,KLOR-CON) 10 mEq tablet   Yes Yes   Sig: Take 10 mEq by mouth daily   predniSONE 20 mg tablet   Yes Yes   Sig: Take 20 mg by mouth see administration instructions As directed   simvastatin (ZOCOR) 20 mg tablet   Yes Yes   Sig: Take 20 mg by mouth   tadalafil (CIALIS) 20 MG tablet   Yes Yes   Sig: Take 20 mg by mouth daily as needed for erectile dysfunction   triamcinolone (KENALOG) 0 1 % lotion   Yes Yes   Sig: Apply topically 3 (three) times a day   varenicline (CHANTIX) 1 mg tablet   Yes Yes   Sig: Take 0 5 mg by mouth 2 (two) times a day   zafirlukast (ACCOLATE) 20 MG tablet   Yes Yes   Sig: Take 20 mg by mouth 2 (two) times a day      Facility-Administered Medications: None       Past Medical History:   Diagnosis Date    Asthma     COPD (chronic obstructive pulmonary disease) ([facility] [address] )     Diabetes mellitus (HCC)     Hyperlipidemia     Hypertension        Past Surgical History:   Procedure Laterality Date    SHOULDER ARTHROPLASTY Right     TOTAL HIP ARTHROPLASTY Right        History reviewed  No pertinent family history  I have reviewed and agree with the history as documented      Social History     Tobacco Use    Smoking status: Current Every Day Smoker     Types: Cigars    Smokeless tobacco: Never Used    Tobacco comment: 3 cigars    Substance Use Topics    Alcohol use: Yes     Comment: social    Drug use: Never        Review of Systems   Constitutional: Negative  HENT: Positive for nosebleeds  Eyes: Negative  Respiratory: Negative  Cardiovascular: Negative  Endocrine: Negative  Genitourinary: Negative  Musculoskeletal: Negative  Skin: Negative  Allergic/Immunologic: Negative  Neurological: Negative  Hematological: Negative  Psychiatric/Behavioral: Negative  All other systems reviewed and are negative  Physical Exam  Physical Exam   Constitutional: He appears well-developed and well-nourished  HENT:   Head: Normocephalic  Right Ear: External ear normal    Left Ear: External ear normal    Nose:       Mouth/Throat: Oropharynx is clear and moist    Bright red blood from the left nare  No visualized source of bleeding   Eyes: Pupils are equal, round, and reactive to light  Right eye exhibits no discharge  Left eye exhibits no discharge  Neck: Normal range of motion  No tracheal deviation present  No thyromegaly present  Cardiovascular: Normal rate, regular rhythm and normal heart sounds  Pulmonary/Chest: Effort normal    Abdominal: Soft  He exhibits no distension  There is no tenderness  There is no guarding  Musculoskeletal: Normal range of motion  Neurological: He is alert  No cranial nerve deficit  Coordination normal    Skin: Skin is warm  Capillary refill takes less than 2 seconds  No erythema  Psychiatric: He has a normal mood and affect  His behavior is normal    Vitals reviewed        Vital Signs  ED Triage Vitals [01/18/20 0623]   Temperature Pulse Respirations Blood Pressure SpO2   97 7 °F (36 5 °C) 76 18 (!) 173/81 99 %      Temp Source Heart Rate Source Patient Position - Orthostatic VS BP Location FiO2 (%)   Temporal Monitor Sitting Left arm --      Pain Score       No Pain           Vitals:    01/18/20 0830 01/18/20 0904 01/18/20 0930 01/18/20 0938   BP: (!) 223/126 (!) 209/113 (!) 212/104 (!) 172/92   Pulse: 73  57    Patient Position - Orthostatic VS: Sitting  Sitting          Visual Acuity      ED Medications  Medications   oxymetazoline (AFRIN) 0 05 % nasal spray 2 spray (2 sprays Each Nare Given 1/18/20 0720)   neomycin-bacitracin-polymyxin b (NEOSPORIN) ointment 1 small application (1 small application Topical Given by Other 1/18/20 0721)   sodium chloride 0 9 % bolus 1,000 mL (1,000 mL Intravenous New Bag 1/18/20 0802)   morphine injection 2 mg (2 mg Intravenous Given 1/18/20 0805)   ondansetron (ZOFRAN) injection 4 mg (4 mg Intravenous Given 1/18/20 0805)   ceFAZolin (ANCEF) IVPB (premix) 2,000 mg (0 mg Intravenous Stopped 1/18/20 0925)   cloNIDine (CATAPRES) tablet 0 2 mg (0 2 mg Oral Given 1/18/20 0904)       Diagnostic Studies  Results Reviewed     Procedure Component Value Units Date/Time    Comprehensive metabolic panel [[id number]]  (Abnormal) Collected:  01/18/20 0755    Lab Status:  Final result Specimen:  Blood from Arm, Left Updated:  01/18/20 0821     Sodium 142 mmol/L      Potassium 3 8 mmol/L      Chloride 107 mmol/L      CO2 24 mmol/L      ANION GAP 11 mmol/L      BUN 24 mg/dL      Creatinine 1 77 mg/dL      Glucose 109 mg/dL      Calcium 8 5 mg/dL      AST 24 U/L      ALT 22 U/L      Alkaline Phosphatase 77 U/L      Total Protein 7 1 g/dL      Albumin 3 3 g/dL      Total Bilirubin 0 30 mg/dL      eGFR 45 ml/min/1 73sq m     Narrative:       [place] guidelines for Chronic Kidney Disease (CKD):     Stage 1 with normal or high GFR (GFR > 90 mL/min/1 73 square meters)    Stage 2 Mild CKD (GFR = 60-89 mL/min/1 73 square meters)    Stage 3A Moderate CKD (GFR = 45-59 mL/min/1 73 square meters)    Stage 3B Moderate CKD (GFR = 30-44 mL/min/1 73 square meters)    Stage 4 Severe CKD (GFR = 15-29 mL/min/1 73 square meters)    Stage 5 End Stage CKD (GFR <15 mL/min/1 73 square meters)  Note: GFR calculation is accurate only with a steady state creatinine    Protime-INR [[id number]]  (Abnormal) Collected:  01/18/20 0755    Lab Status:  Final result Specimen:  Blood from Arm, Left Updated:  01/18/20 0814     Protime 14 9 seconds      INR 1 16    APTT [[id number]]  (Normal) Collected:  01/18/20 0755    Lab Status:  Final result Specimen:  Blood from Arm, Left Updated:  01/18/20 0814     PTT 35 seconds     CBC and differential [[id number]]  (Abnormal) Collected:  01/18/20 0755    Lab Status:  Final result Specimen:  Blood from Arm, Left Updated:  01/18/20 0809     WBC 4 89 Thousand/uL      RBC 4 20 Million/uL      Hemoglobin 11 7 g/dL      Hematocrit 36 2 %      MCV 86 fL      MCH 27 9 pg      MCHC 32 3 g/dL      RDW 13 6 %      MPV 9 8 fL      Platelets 230 Thousands/uL      nRBC 0 /100 WBCs      Neutrophils Relative 63 %      Immat GRANS % 0 %      Lymphocytes Relative 23 %      Monocytes Relative 9 %      Eosinophils Relative 4 %      Basophils Relative 1 %      Neutrophils Absolute 3 14 Thousands/µL      Immature Grans Absolute 0 01 Thousand/uL      Lymphocytes Absolute 1 11 Thousands/µL      Monocytes Absolute 0 42 Thousand/µL      Eosinophils Absolute 0 17 Thousand/µL      Basophils Absolute 0 04 Thousands/µL                  No orders to display              Procedures  Procedures         ED Course                               MDM  Number of Diagnoses or Management Options  Diabetes ([facility] ): Hyperlipidemia:   Hypertension:   Left-sided epistaxis:   Posterior epistaxis:   Renal insufficiency:   Diagnosis management comments: Procedure note  Epistaxis from the left  nare  Patient's bilateral nares were  packed with Afrin impregnated cotton balls    External pressure was applied during this period time  The patient continued to have bleeding in the posterior oropharynx after 10 minutes    The patient's left nare was then packed with a Rapid rhino and packing was applied to the right nare  Hemostasis was not achieved    An Epistat nasal catheter was then placed in the patient's left nare  Minimal bleeding around the Epistat remains    0817  I speak Dr Dipak Joshi of ENT   He asked that the BP be controlled     I will admit to hospitalist at [facility]   Minimal oozing around the Epistat in the left nare               Disposition  Final diagnoses:   Left-sided epistaxis   Posterior epistaxis   Hypertension   Hyperlipidemia   Diabetes ([address] )   Renal insufficiency     Time reflects when diagnosis was documented in both MDM as applicable and the Disposition within this note     Time User Action Codes Description Comment    1/18/2020  8:06 AM Nikki Pillion Add [R04 0] Left-sided epistaxis     1/18/2020  8:06 AM Nikki Pillion Add [R04 0] Posterior epistaxis     1/18/2020  8:06 AM Nikki Pillion Add [I10] Hypertension     1/18/2020  8:06 AM Nikki Pillion Add [E78 5] Hyperlipidemia     1/18/2020  8:06 AM Nikki Pillion Add [E11 9] Diabetes ([facility] [address] )     1/18/2020  9:35 AM Nikki Pillion Add [N28 9] Renal insufficiency       ED Disposition     ED Disposition Condition Date/Time Comment    Transfer to Another Facility-In Network  Sat Jan 18, 2020  8:06 AM Anna Sheehan should be transferred out         MD Documentation      Most Recent Value   Patient Condition  The patient has been stabilized such that within reasonable medical probability, no material deterioration of the patient condition or the condition of the unborn child(qing) is likely to result from the transfer   Reason for Transfer  Level of Care needed not available at this facility   Benefits of Transfer  Specialized equipment and/or services available at the receiving facility (Include comment)________________________   Risks of Transfer  Potential for delay in receiving treatment   Accepting Physician  DR WINDY MOYA VICKI [facility]    Provider Certification  General risk, such as traffic hazards, adverse weather conditions, rough terrain or turbulence, possible failure of equipment (including vehicle or aircraft), or consequences of actions of persons outside the control of the transport personnel      RN Documentation      Most Recent Value   Medications Reviewed with Next Provider of Service  Yes   Transport Mode  Ambulance   Level of Care  Advanced life support      Follow-up Information    None         Patient's Medications   Discharge Prescriptions    No medications on file     No discharge procedures on file      ED Provider  Electronically Signed by           Bon Marcos DO  01/18/20 9077

## 2020-01-18 NOTE — ASSESSMENT & PLAN NOTE
Xray to check for fracture, which is unlikely  Suspect rotator cuff injury - will consult PT  Pain control w/ tylenol and Robaxin RTC  Oxy 2 5 prn  Voltaren topl  Avoid PO NSAIDS and ASA

## 2020-01-18 NOTE — EMTALA/ACUTE CARE TRANSFER
[facility] EMERGENCY DEPARTMENT  [address] [place] [id number]  Dept: [phone]      EMTALA TRANSFER CONSENT    NAME Yon HODGSON 1950                              MRN [id number]    I have been informed of my rights regarding examination, treatment, and transfer   by Dr Sol Bond DO    Benefits: Specialized equipment and/or services available at the receiving facility (Include comment)________________________    Risks: Potential for delay in receiving treatment      Consent for Transfer:  I acknowledge that my medical condition has been evaluated and explained to me by the emergency department physician or other qualified medical person and/or my attending physician, who has recommended that I be transferred to the service of  Accepting Physician: DR Galo Navas  at    The above potential benefits of such transfer, the potential risks associated with such transfer, and the probable risks of not being transferred have been explained to me, and I fully understand them  The doctor has explained that, in my case, the benefits of transfer outweigh the risks  I agree to be transferred  I authorize the performance of emergency medical procedures and treatments upon me in both transit and upon arrival at the receiving facility  Additionally, I authorize the release of any and all medical records to the receiving facility and request they be transported with me, if possible  I understand that the safest mode of transportation during a medical emergency is an ambulance and that the Hospital advocates the use of this mode of transport  Risks of traveling to the receiving facility by car, including absence of medical control, life sustaining equipment, such as oxygen, and medical personnel has been explained to me and I fully understand them      (JULIETA CORRECT BOX BELOW)  [  ]  I consent to the stated transfer and to be transported by ambulance/helicopter  [  ]  I consent to the stated transfer, but refuse transportation by ambulance and accept full responsibility for my transportation by car  I understand the risks of non-ambulance transfers and I exonerate the Hospital and its staff from any deterioration in my condition that results from this refusal     X___________________________________________    DATE  20  TIME________  Signature of patient or legally responsible individual signing on patient behalf           RELATIONSHIP TO PATIENT_________________________          Provider Certification    NAME Michail Koyanagi                                         1950                              MRN [id number]    A medical screening exam was performed on the above named patient  Based on the examination:    Condition Necessitating Transfer The primary encounter diagnosis was Left-sided epistaxis  Diagnoses of Posterior epistaxis, Hypertension, Hyperlipidemia, Diabetes ([facility] [address] ), and Renal insufficiency were also pertinent to this visit  Patient Condition: The patient has been stabilized such that within reasonable medical probability, no material deterioration of the patient condition or the condition of the unborn child(qing) is likely to result from the transfer    Reason for Transfer: Level of Care needed not available at this facility    Transfer Requirements: Facility     · Space available and qualified personnel available for treatment as acknowledged by    · Agreed to accept transfer and to provide appropriate medical treatment as acknowledged by       DR WINDY MOYA [facility]   · Appropriate medical records of the examination and treatment of the patient are provided at the time of transfer   [address] [facility], [address] _______  · Transfer will be performed by qualified personnel from    and appropriate transfer equipment as required, including the use of necessary and appropriate life support measures      Provider Certification: I have examined the patient and explained the following risks and benefits of being transferred/refusing transfer to the patient/family:  General risk, such as traffic hazards, adverse weather conditions, rough terrain or turbulence, possible failure of equipment (including vehicle or aircraft), or consequences of actions of persons outside the control of the transport personnel      Based on these reasonable risks and benefits to the patient and/or the unborn child(qing), and based upon the information available at the time of the patients examination, I certify that the medical benefits reasonably to be expected from the provision of appropriate medical treatments at another medical facility outweigh the increasing risks, if any, to the individuals medical condition, and in the case of labor to the unborn child, from effecting the transfer      X____________________________________________ DATE 01/18/20        TIME_______      ORIGINAL - SEND TO MEDICAL RECORDS   COPY - SEND WITH PATIENT DURING TRANSFER

## 2020-01-18 NOTE — ASSESSMENT & PLAN NOTE
Patient will be restarted home medication a week increase the hydrochlorothiazide to 50 mg outpatient follow-up

## 2020-01-19 LAB
ANION GAP SERPL CALCULATED.3IONS-SCNC: 7 MMOL/L (ref 4–13)
BUN SERPL-MCNC: 15 MG/DL (ref 5–25)
CALCIUM SERPL-MCNC: 8.8 MG/DL (ref 8.3–10.1)
CHLORIDE SERPL-SCNC: 109 MMOL/L (ref 100–108)
CO2 SERPL-SCNC: 23 MMOL/L (ref 21–32)
CREAT SERPL-MCNC: 1.48 MG/DL (ref 0.6–1.3)
ERYTHROCYTE [DISTWIDTH] IN BLOOD BY AUTOMATED COUNT: 13.6 % (ref 11.6–15.1)
GFR SERPL CREATININE-BSD FRML MDRD: 55 ML/MIN/1.73SQ M
GLUCOSE SERPL-MCNC: 91 MG/DL (ref 65–140)
HCT VFR BLD AUTO: 35 % (ref 36.5–49.3)
HGB BLD-MCNC: 11.5 G/DL (ref 12–17)
MAGNESIUM SERPL-MCNC: 2.1 MG/DL (ref 1.6–2.6)
MCH RBC QN AUTO: 27.6 PG (ref 26.8–34.3)
MCHC RBC AUTO-ENTMCNC: 32.9 G/DL (ref 31.4–37.4)
MCV RBC AUTO: 84 FL (ref 82–98)
PLATELET # BLD AUTO: 223 THOUSANDS/UL (ref 149–390)
PMV BLD AUTO: 10.2 FL (ref 8.9–12.7)
POTASSIUM SERPL-SCNC: 3.5 MMOL/L (ref 3.5–5.3)
RBC # BLD AUTO: 4.16 MILLION/UL (ref 3.88–5.62)
SODIUM SERPL-SCNC: 139 MMOL/L (ref 136–145)
WBC # BLD AUTO: 4.99 THOUSAND/UL (ref 4.31–10.16)

## 2020-01-19 PROCEDURE — 83735 ASSAY OF MAGNESIUM: CPT | Performed by: GENERAL PRACTICE

## 2020-01-19 PROCEDURE — 85027 COMPLETE CBC AUTOMATED: CPT | Performed by: GENERAL PRACTICE

## 2020-01-19 PROCEDURE — 99221 1ST HOSP IP/OBS SF/LOW 40: CPT | Performed by: OTOLARYNGOLOGY

## 2020-01-19 PROCEDURE — 97161 PT EVAL LOW COMPLEX 20 MIN: CPT

## 2020-01-19 PROCEDURE — 80048 BASIC METABOLIC PNL TOTAL CA: CPT | Performed by: GENERAL PRACTICE

## 2020-01-19 PROCEDURE — 99238 HOSP IP/OBS DSCHRG MGMT 30/<: CPT | Performed by: FAMILY MEDICINE

## 2020-01-19 PROCEDURE — 2Y41X5Z PACKING OF NASAL REGION USING PACKING MATERIAL: ICD-10-PCS | Performed by: FAMILY MEDICINE

## 2020-01-19 RX ORDER — OXYMETAZOLINE HYDROCHLORIDE 0.05 G/100ML
2 SPRAY NASAL EVERY 12 HOURS PRN
Status: DISCONTINUED | OUTPATIENT
Start: 2020-01-19 | End: 2020-01-19 | Stop reason: HOSPADM

## 2020-01-19 RX ORDER — HYDROCHLOROTHIAZIDE 25 MG/1
50 TABLET ORAL DAILY
Qty: 30 TABLET | Refills: 0 | Status: SHIPPED | OUTPATIENT
Start: 2020-01-19

## 2020-01-19 RX ADMIN — LOSARTAN POTASSIUM 100 MG: 50 TABLET, FILM COATED ORAL at 08:32

## 2020-01-19 RX ADMIN — BUPROPION HYDROCHLORIDE 150 MG: 150 TABLET, EXTENDED RELEASE ORAL at 08:33

## 2020-01-19 RX ADMIN — HYDROCHLOROTHIAZIDE 25 MG: 25 TABLET ORAL at 08:33

## 2020-01-19 RX ADMIN — DICLOFENAC 2 G: 10 GEL TOPICAL at 08:32

## 2020-01-19 RX ADMIN — GABAPENTIN 300 MG: 300 CAPSULE ORAL at 08:32

## 2020-01-19 RX ADMIN — METHOCARBAMOL TABLETS 500 MG: 500 TABLET, COATED ORAL at 11:15

## 2020-01-19 RX ADMIN — METHOCARBAMOL TABLETS 500 MG: 500 TABLET, COATED ORAL at 06:15

## 2020-01-19 RX ADMIN — PANTOPRAZOLE SODIUM 20 MG: 20 TABLET, DELAYED RELEASE ORAL at 06:15

## 2020-01-19 RX ADMIN — AMLODIPINE BESYLATE 10 MG: 10 TABLET ORAL at 08:32

## 2020-01-19 RX ADMIN — ACETAMINOPHEN 975 MG: 325 TABLET ORAL at 13:24

## 2020-01-19 RX ADMIN — ACETAMINOPHEN 975 MG: 325 TABLET ORAL at 06:15

## 2020-01-19 RX ADMIN — CLONIDINE HYDROCHLORIDE 0.2 MG: 0.2 TABLET ORAL at 08:33

## 2020-01-19 RX ADMIN — POTASSIUM CHLORIDE 10 MEQ: 750 TABLET, EXTENDED RELEASE ORAL at 08:32

## 2020-01-19 RX ADMIN — AMOXICILLIN AND CLAVULANATE POTASSIUM 1 TABLET: 875; 125 TABLET, FILM COATED ORAL at 08:33

## 2020-01-19 RX ADMIN — DICLOFENAC 2 G: 10 GEL TOPICAL at 11:15

## 2020-01-19 RX ADMIN — FLUTICASONE FUROATE AND VILANTEROL TRIFENATATE 1 PUFF: 200; 25 POWDER RESPIRATORY (INHALATION) at 08:34

## 2020-01-19 NOTE — DISCHARGE SUMMARY
Discharge- Jarod Blanco 1950, 77 y o  male MRN: [id number]    Unit/Bed#: -01 Encounter: [id number]    Primary Care Provider: Apple Obrien MD   Date and time admitted to hospital: 1/18/2020  4:01 PM        * Epistaxis  Assessment & Plan  Improved   Packed in ER  No active bleeding  ENT evaluation patient is stable to be discharged    Eczema  Assessment & Plan  Continue home medication    CKD (chronic kidney disease) stage 3, GFR 30-59 ml/min ([facility])  Assessment & Plan  Outpatient follow-up as needed    Asthma  Assessment & Plan  No acute exacerbation    Tobacco abuse  Assessment & Plan  Wellbutrin  Pt is not taking Chantix at this time  Refuses patch    Acute pain of right shoulder  Assessment & Plan  Xray to check for fracture, which is unlikely  Follow as an outpatient    Hypertensive urgency  Assessment & Plan  Patient will be restarted home medication a week increase the hydrochlorothiazide to 50 mg outpatient follow-up          Discharging Physician / Practitioner: Philipp Mckeon MD  PCP: Apple Obrien MD  Admission Date:   Admission Orders (From admission, onward)     Ordered        01/18/20 1613  Inpatient Admission  Once                   Discharge Date: 01/19/20    Resolved Problems  Date Reviewed: 1/19/2020    None          Consultations During Hospital Stay:  · ENT    Procedures Performed:   ·     Significant Findings / Test Results:   ·     Incidental Findings:   ·     Test Results Pending at Discharge (will require follow up):   ·      Outpatient Tests Requested:  ·     Complications:      Reason for Admission:     Hospital Course:     Jarod Blanco is a 77 y o  male patient who originally presented to the hospital on 1/18/2020 due to epistaxis  Patient was found to have elevated high blood pressure which he was most likely the reason why he was bleeding  He was evaluated by ENT with remove patch    Patient has no have any episode of bleeding for the last 24 hour he will be discharged home  Patient will have an increase of hydrochlorothiazide and we need to follow-up as an outpatient  Please see above list of diagnoses and related plan for additional information  Condition at Discharge: stable     Discharge Day Visit / Exam:     Subjective:  Patient denies any nose bleeding  Denies any further complaints  Vitals: Blood Pressure: 147/96 (01/19/20 0706)  Pulse: 89 (01/19/20 0706)  Temperature: 98 1 °F (36 7 °C) (01/19/20 0706)  Temp Source: Oral (01/18/20 1600)  Respirations: 19 (01/19/20 0706)  Height: 6' 4" (193 cm) (01/18/20 1855)  Weight - Scale: 111 kg (245 lb 1 6 oz) (01/18/20 1855)  SpO2: 97 % (01/19/20 0706)  Exam:   Physical Exam   Constitutional: He is oriented to person, place, and time  No distress  HENT:   Head: Normocephalic  Right Ear: External ear normal    Mouth/Throat: No oropharyngeal exudate  No active bleeding   Cardiovascular: Normal rate, regular rhythm, normal heart sounds and intact distal pulses  Exam reveals no gallop and no friction rub  No murmur heard  Pulmonary/Chest: Effort normal and breath sounds normal  No stridor  No respiratory distress  Abdominal: Soft  Bowel sounds are normal  He exhibits no distension  There is no tenderness  There is no guarding  Musculoskeletal: Normal range of motion  Neurological: He is alert and oriented to person, place, and time  Skin: Skin is warm  Capillary refill takes more than 3 seconds  He is not diaphoretic  Psychiatric: He has a normal mood and affect  Discussion with Family:     Discharge instructions/Information to patient and family:   See after visit summary for information provided to patient and family  Provisions for Follow-Up Care:  See after visit summary for information related to follow-up care and any pertinent home health orders        Disposition:     Home    For Discharges to [address] [place] [address] SNF:   · Not Applicable to this Patient - Not Applicable to this Patient    Planned Readmission:      Discharge Statement:  I spent 30 minutes discharging the patient  This time was spent on the day of discharge  I had direct contact with the patient on the day of discharge  Greater than 50% of the total time was spent examining patient, answering all patient questions, arranging and discussing plan of care with patient as well as directly providing post-discharge instructions  Additional time then spent on discharge activities  Discharge Medications:  See after visit summary for reconciled discharge medications provided to patient and family        ** Please Note: This note has been constructed using a voice recognition system **

## 2020-01-19 NOTE — PHYSICAL THERAPY NOTE
Physical Therapy Evaluation    Patient's Name: Kirt Bernheim    Admitting Diagnosis  Epistaxis [R04 0]    Problem List  Patient Active Problem List   Diagnosis    Epistaxis    Hypertensive urgency    Acute pain of right shoulder    Tobacco abuse    Asthma    CKD (chronic kidney disease) stage 3, GFR 30-59 ml/min ([facility])    Eczema       Past Medical History  Past Medical History:   Diagnosis Date    Asthma     [facility]     Diabetes mellitus [facility]     Hyperlipidemia     Hypertension        Past Surgical History  Past Surgical History:   Procedure Laterality Date    SHOULDER ARTHROPLASTY Right     TOTAL HIP ARTHROPLASTY Right         01/19/20 1044   Note Type   Note type Eval only   Pain Assessment   Pain Assessment 0-10   Pain Score 6   Pain Type Acute pain   Pain Location Head   Pain Orientation Bilateral   Pain Descriptors Aching   Multiple Pain Sites Yes   Pain 2   Pain Score 2 5  (R shoulder with movement)   Home Living   Type of [address] [place] [address] Function   Level of [place] Independent with ADLs and functional mobility   Lives With Son   ADL Assistance Independent   IADLs Independent   Falls in the last 6 months 0   Vocational Retired   Restrictions/Precautions   [place]earing Precautions Per Order No   General   Additional Pertinent History hx R TSA    Family/Caregiver Present Yes   Cognition   Overall Cognitive Status WFL   Arousal/Participation Alert   Orientation Level Oriented X4   Memory Within functional limits   Following Commands Follows multistep commands without difficulty   RUE Assessment   RUE Assessment X   RUE Overall AROM   R Shoulder Flexion 70 degrees, limited by pain   R Shoulder ABduction 30 degrees limited by pain with lateral trunk flexion compensation   RUE Overall PROM   R Shoulder Flexion 80 degrees, limited by pain   R Shoulder ABduction 60 degrees limited by pain   RUE Strength   R Shoulder Flexion 3-/5   R Shoulder ABduction 2/5   LUE Assessment   LUE Assessment WFL   RLE Assessment   RLE Assessment WFL   LLE Assessment   LLE Assessment WFL   Coordination   Movements are Fluid and Coordinated 1   Sensation WFL   Bed Mobility   Supine to Sit 7  Independent   Sit to Supine 7  Independent   Transfers   Sit to Stand 7  Independent   Stand to Sit 7  Independent   Ambulation/Elevation   Gait pattern WNL   Gait Assistance 7  Independent   Distance 200 ft  (BP after ambulation 160/95)   Balance   Static Sitting Normal   Dynamic Sitting Normal   Static Standing Normal   Dynamic Standing Good   Ambulatory Good   Endurance Deficit   Endurance Deficit No   Activity Tolerance   Activity Tolerance Patient tolerated treatment well   Nurse Made Aware RN cleared pt for PT session/mobility, updated following session   Assessment   Prognosis Good   Assessment Pt is a pleasant 76 yo male admitted on 1/18 with epistaxis  Pt presents to PT with c/o R should pain after dogs pulled on his leash last week  Pt reports hx R TSA in 2005  Currently, pt Indep with all mobility, resides with son, no acute PT needs  A/PROM R should limited secondary to pain  Educated pt on AA shoulder flexion/abd/ER stretches for home, verbalized understanding and able to teach back  Recommend OPPT for shoulder injury, pt agreeable  No further acute PT needs, will sign off      Barriers to Discharge None   Goals   Patient Goals "to go home"   Recommendation   Recommendation Outpatient PT   PT - OK to Discharge Yes   Barthel Index   Feeding 10   Bathing 5   Grooming Score 5   Dressing Score 10   Bladder Score 10   Bowels Score 10   Toilet Use Score 10   Transfers (Bed/Chair) Score 15   Mobility (Level Surface) Score 15   Stairs Score 10   Barthel Index Score 100       Pamula Height, PT, DPT

## 2020-01-19 NOTE — UTILIZATION REVIEW
Notification of Inpatient Admission/Inpatient Authorization Request   This is a Notification of Inpatient Admission for [address]  Be advised that this patient was admitted to our facility under Inpatient Status  Contact Redd Elizalde at [phone] for additional admission information  [facility]  DEDICATED [phone]  Patient Name:   Weston Lloyd   YOB: 1950       [address]   [address]:   [place] [address]  Tax ID: [id number]  NPI: [id number] Attending Provider/NPI: Preston Jarrett [[id number]]   Place of Service Code: 24     Place of Service Name:  [address] [place] [address]   Start Date: 1/18/20 1601     Discharge Date & Time: 1/19/2020  2:13 PM    Type of Admission: Inpatient Status Discharge Disposition (if discharged): Home/Self Care   Patient Diagnoses: Epistaxis [R04 0]     Orders: Admission Orders (From admission, onward)     Ordered        01/18/20 1613  Inpatient Admission  Once                    Assigned Utilization Review Contact: Redd Elizalde  Utilization   Network Utilization Review Department  Phone: [phone]; Fax [phone]  Email: [email or web address]   ATTENTION PAYERS: Please call the assigned Utilization  directly with any questions or concerns ALL voicemails in the department are confidential  Send all requests for admission clinical reviews, approved or denied determinations and any other requests to dedicated fax number belonging to the campus where the patient is receiving treatment

## 2020-01-19 NOTE — UTILIZATION REVIEW
Initial Clinical Review    Admission: Date/Time/Statement: Inpatient Admission Orders (From admission, onward)     Ordered        01/18/20 1613  Inpatient Admission  Once                   Orders Placed This Encounter   Procedures    Inpatient Admission     Standing Status:   Standing     Number of Occurrences:   1     Order Specific Question:   Admitting Physician     Answer:   Juana Alejandre [[id number]]     Order Specific Question:   Level of Care     Answer:   Med Surg [16]     Order Specific Question:   Estimated length of stay     Answer:   More than 2 Midnights     Order Specific Question:   Certification     Answer:   I certify that inpatient services are medically necessary for this patient for a duration of greater than two midnights  See H&P and MD Progress Notes for additional information about the patient's course of treatment  ED Arrival Information     Patient not seen in ED -- transferred from [address] ED                     Chief complaint:  Nosebleed    Assessment/Plan:  76 y/o male with PMHx of HTN, CKD, Asthma,  who initially presented to [place] [address] ED with epistaxis that started this AM, started when he went to blow his nose  Held pressure and it would not stop so presented to the ED  In ED packing placed and epistaxis resolved  Pt  States he hurt his shoulder ~5 days ago and has been taking  mg TID for relief  States he has not taken any of hs reg po meds this AM   BP elevated in ED, clonidine 0 2 mg given  Transferred to [facility] for higher level of care  Admit inpatient to M/S/Tele unit with Epistaxis and Hypertensive urgency  Packing in place, check CBC in AM, ENT consult  Change clonidine to BID, start on Hydral or Aldactone for better BP control  Labetalol prn for SBP >170 1h after giving home meds  If BP remains high will consider cardene gtt in setting of epistaxis  ENT consult 1/19 -- A: Recurrent epistaxis: Likely 2/2 uncontrolled HTN   Plan:  Deflated the posterior balloon on both sides  No active bleeding or in the NP  Packing removed without any bleeding  After removal of packing: For any repeat bleeding spray oxymetazoline onto tissue, cotton ball, or nasal tampon and place into the side where bleeding is coming from  Pinch and hold the nostrils completely closed for 10 minutes without releasing pressure  If bleeding continues repeat 1 time   If bleeding persists after second attempt please contact [facility] on call or present to nearest emergency department        ED Triage Vitals   Temperature Pulse Respirations Blood Pressure SpO2   01/18/20 1600 01/18/20 1600 01/18/20 1600 01/18/20 1600 01/18/20 1600   98 °F (36 7 °C) 63 18 (!) 172/92 98 %      Temp Source Heart Rate Source Patient Position - Orthostatic VS BP Location FiO2 (%)   01/18/20 1600 -- 01/18/20 1600 01/18/20 1600 --   Oral  Sitting Left arm       Pain Score       01/18/20 1607       3        Wt Readings from Last 1 Encounters:   01/18/20 111 kg (245 lb 1 6 oz)     Additional Vital Signs:   Date/Time  Temp  Pulse  Resp  BP  MAP (mmHg)  SpO2  O2 Device   01/19/20 07:06:22  98 1 °F (36 7 °C)  89  19  147/96  113  97 %  --   01/18/20 23:24:15  97 9 °F (36 6 °C)  83  18  151/95  114  96 %  --   01/18/20 2100  --  --  --  --  --  --  None (Room air)   01/18/20 18:55:18  --  77  --  161/97  118  99 %  --   01/18/20 1810  --  --  --  --  --  98 %  None (Room air)   01/18/20 1600  98 °F (36 7 °C)  63  18  172/92Abnormal   119  98 %  None (Room air)       Pertinent Labs/Diagnostic Test Results:   Results from last 7 days   Lab Units 01/19/20  0442 01/18/20  0755   WBC Thousand/uL 4 99 4 89   HEMOGLOBIN g/dL 11 5* 11 7*   HEMATOCRIT % 35 0* 36 2*   PLATELETS Thousands/uL 223 246   NEUTROS ABS Thousands/µL  --  3 14     Results from last 7 days   Lab Units 01/19/20  0442 01/18/20  0755   SODIUM mmol/L 139 142   POTASSIUM mmol/L 3 5 3 8   CHLORIDE mmol/L 109* 107   CO2 mmol/L 23 24   ANION GAP mmol/L 7 11   BUN mg/dL 15 24   CREATININE mg/dL 1 48* 1 77*   EGFR ml/min/1 73sq m 55 45   CALCIUM mg/dL 8 8 8 5   MAGNESIUM mg/dL 2 1  --      Results from last 7 days   Lab Units 01/18/20  0755   AST U/L 24   ALT U/L 22   ALK PHOS U/L 77   TOTAL PROTEIN g/dL 7 1   ALBUMIN g/dL 3 3*   TOTAL BILIRUBIN mg/dL 0 30     Results from last 7 days   Lab Units 01/18/20  1621 01/18/20  1118   POC GLUCOSE mg/dl 83 117     Results from last 7 days   Lab Units 01/19/20  0442 01/18/20  0755   GLUCOSE RANDOM mg/dL 91 109     Results from last 7 days   Lab Units 01/18/20  0755   PROTIME seconds 14 9*   INR  1 16   PTT seconds 35       Past Medical History:   Diagnosis Date    Asthma     [facility]ch[facility] disease) [facility] )     Diabetes mellitus ([facility] )     Hyperlipidemia     Hypertension      Present on Admission:  **None**      Admitting Diagnosis: Epistaxis [R04 0]  Age/Sex: 77 y o  male  Admission Orders:  Scheduled Medications:  Medications:  acetaminophen 975 mg Oral Q8H [address]   amLODIPine 10 mg Oral Daily   amoxicillin-clavulanate 1 tablet Oral Q12H [address]   buPROPion 150 mg Oral BID   cloNIDine 0 2 mg Oral Q12H GIA   diclofenac sodium 2 g Topical 4x Daily   fluticasone 2 spray Nasal Daily   fluticasone-vilanterol 1 puff Inhalation Daily   gabapentin 300 mg Oral BID   hydrochlorothiazide 25 mg Oral Daily   losartan 100 mg Oral Daily   methocarbamol 500 mg Oral Q6H GIA   montelukast 10 mg Oral HS   pantoprazole 20 mg Oral BID AC   potassium chloride 10 mEq Oral Daily   pravastatin 40 mg Oral Daily With Dinner        PRN Meds:  albuterol 2 puff Inhalation Q4H PRN   clobetasol  Topical Daily PRN   Labetalol HCl 20 mg Intravenous Q4H PRN   oxyCODONE 2 5 mg Oral Q4H PRN   oxymetazoline 2 spray Each Nare Q12H PRN       IP CONSULT TO CASE MANAGEMENT  IP CONSULT TO ENT    Network Utilization Review Department  [email or web address]  ATTENTION: Please call with any questions or concerns to [phone] and carefully listen to the prompts so that you are directed to the right person  All voicemails are confidential   Keith Weber all requests for admission clinical reviews, approved or denied determinations and any other requests to dedicated fax number below belonging to the campus where the patient is receiving treatment   List of dedicated fax numbers for the Facilities:  [address] DENIALS (Administrative/Medical Necessity) [phone]   1000  [facility] (Maternity/NICU/Pediatrics) [phone]   Alecia Messer [phone]   [facility] [phone]   [address] [place] [address] [phone]   145 [facility]  [phone]   12[facility] 1525 [facility] [phone]   [facility]  [phone]   [address]5 [facility]  [facility] 1000 [facility] [phone]

## 2020-01-19 NOTE — CONSULTS
Consultation - Michael Mcgarry 77 y o  male MRN: [id number]  Unit/Bed#: -01 Encounter: [id number]        Assessment/Plan:    Hypertension: Recommend good BP control with goals of SBP <150 and DBP <90  Recurrent epistaxis: Likely 2/2 uncontrolled HTN  We discussed the nature of ongoing epistaxis  Packing appears to have addressed the likely bleeding site at this time  Deflated the posterior balloon on both sides  No active bleeding or in the NP  Packing removed without any bleeding  After removal of packing: For any repeat bleeding spray oxymetazoline onto tissue, cotton ball, or nasal tampon and place into the side where bleeding is coming from  Pinch and hold the nostrils completely closed for 10 minutes without releasing pressure  If bleeding continues repeat 1 time  If bleeding persists after second attempt please contact [facility] on call or present to nearest emergency department  History of Present Illness   Physician Requesting Consult: Maria Elena Armijo,*  Reason for Consult / Principal Problem: Recurrent epistaxis  HPI: Bridgett Primrose is a 77y o  year old male who presents with history of poorly controlled hypertension on amlodipine, clonidine, hydrochlorothiazide, and losartan  He notes not taking his blood pressure medications the day that he was admitted  He notes that he blew is nose and had immediate bleeding from his nose which did not stop  He presented the emergency room and after attempted cautery as well as local control measures epistaxis did not decrease packing was placed 1st in the right side followed by a posterior pack in the left  After adequate blood pressure control and packing the epistaxis decreased  He was transferred down to [facility] for further management  No previous history of severe epistaxis  No history of nasal trauma  No blood transfusion  He does take aspirin for pain and was taking 650 mg 3 times daily    No other blood thinners  Review of systems:  10 Point ROS was performed and negative except as above or otherwise noted in the medical record  Historical Information   Past Medical History:   Diagnosis Date    Asthma     [facility]ch[facility]) ([facility] )     Diabetes mellitus ([facility] )     Hyperlipidemia     Hypertension      Past Surgical History:   Procedure Laterality Date    SHOULDER ARTHROPLASTY Right     TOTAL HIP ARTHROPLASTY Right      Social History   Social History     Substance and Sexual Activity   Alcohol Use Yes    Frequency: Monthly or less    Comment: social     Social History     Substance and Sexual Activity   Drug Use Never     Social History     Tobacco Use   Smoking Status Current Every Day Smoker    Types: Cigars   Smokeless Tobacco Never Used   Tobacco Comment    3 cigars      Family History: History reviewed  No pertinent family history      Meds/Allergies   all current active meds have been reviewed, current meds:   Current Facility-Administered Medications   Medication Dose Route Frequency    acetaminophen (TYLENOL) tablet 975 mg  975 mg Oral Q8H [address]    albuterol (PROVENTIL HFA,VENTOLIN HFA) inhaler 2 puff  2 puff Inhalation Q4H PRN    amLODIPine (NORVASC) tablet 10 mg  10 mg Oral Daily    amoxicillin-clavulanate (AUGMENTIN) 875-125 mg per tablet 1 tablet  1 tablet Oral Q12H [address]    buPROPion (WELLBUTRIN SR) 12 hr tablet 150 mg  150 mg Oral BID    clobetasol (TEMOVATE) 0 05 % cream   Topical Daily PRN    cloNIDine (CATAPRES) tablet 0 2 mg  0 2 mg Oral Q12H GIA    diclofenac sodium (VOLTAREN) 1 % topical gel 2 g  2 g Topical 4x Daily    fluticasone (FLONASE) 50 mcg/act nasal spray 2 spray  2 spray Nasal Daily    fluticasone-vilanterol (BREO ELLIPTA) 200-25 MCG/INH inhaler 1 puff  1 puff Inhalation Daily    gabapentin (NEURONTIN) capsule 300 mg  300 mg Oral BID    hydrochlorothiazide (HYDRODIURIL) tablet 25 mg  25 mg Oral Daily    Labetalol HCl (NORMODYNE) injection 20 mg  20 mg Intravenous Q4H PRN    losartan (COZAAR) tablet 100 mg  100 mg Oral Daily    methocarbamol (ROBAXIN) tablet 500 mg  500 mg Oral Q6H [facility]    montelukast (SINGULAIR) tablet 10 mg  10 mg Oral HS    oxyCODONE (ROXICODONE) IR tablet 2 5 mg  2 5 mg Oral Q4H PRN    pantoprazole (PROTONIX) EC tablet 20 mg  20 mg Oral BID AC    potassium chloride (K-DUR,KLOR-CON) CR tablet 10 mEq  10 mEq Oral Daily    pravastatin (PRAVACHOL) tablet 40 mg  40 mg Oral Daily With Dinner    and PTA meds:   Prior to Admission Medications   Prescriptions Last Dose Informant Patient Reported? Taking? albuterol (PROVENTIL HFA,VENTOLIN HFA) 90 mcg/act inhaler   Yes No   Sig: Inhale 2 puffs 4 (four) times a day   amLODIPine (NORVASC) 10 mg tablet   Yes No   Sig: Take 10 mg by mouth daily   buPROPion (WELLBUTRIN SR) 150 mg 12 hr tablet   Yes No   Sig: Take 150 mg by mouth 2 (two) times a day   cloNIDine (CATAPRES) 0 2 mg tablet   Yes No   Sig: Take 0 2 mg by mouth daily   clobetasol (TEMOVATE) 0 05 % ointment   Yes No   Sig: Apply topically 2 (two) times a day   diclofenac sodium (VOLTAREN) 1 %   Yes No   Sig: Apply 2 g topically 4 (four) times a day   fluticasone (FLOVENT HFA) 110 MCG/ACT inhaler   Yes No   Sig: Inhale 2 puffs 2 (two) times a day Rinse mouth after use     fluticasone-salmeterol (ADVAIR, WIXELA) 500-50 mcg/dose inhaler   Yes No   Sig: Inhale 1 puff 2 (two) times a day Rinse mouth after use    gabapentin (NEURONTIN) 300 mg capsule   Yes No   Sig: Take 300 mg by mouth 2 (two) times a day   hydrochlorothiazide (HYDRODIURIL) 25 mg tablet   Yes No   Sig: Take 25 mg by mouth daily   ipratropium-albuterol (DUO-NEB) 0 5-2 5 mg/3 mL nebulizer solution   Yes No   Sig: Take 3 mL by nebulization 4 (four) times a day   losartan (COZAAR) 100 MG tablet   Yes No   Sig: Take 100 mg by mouth daily   mometasone (NASONEX) 50 mcg/act nasal spray   Yes No   Si sprays into each nostril daily   omeprazole (PriLOSEC) 20 mg delayed release capsule   Yes No   Sig: Take 20 mg by mouth 2 (two) times a day   potassium chloride (K-DUR,KLOR-CON) 10 mEq tablet   Yes No   Sig: Take 10 mEq by mouth daily   predniSONE 20 mg tablet Not Taking at Unknown time  Yes No   Sig: Take 20 mg by mouth see administration instructions As directed   simvastatin (ZOCOR) 20 mg tablet   Yes No   Sig: Take 20 mg by mouth   tadalafil (CIALIS) 20 MG tablet   Yes No   Sig: Take 20 mg by mouth daily as needed for erectile dysfunction   triamcinolone (KENALOG) 0 1 % lotion   Yes No   Sig: Apply topically 3 (three) times a day   varenicline (CHANTIX) 1 mg tablet   Yes No   Sig: Take 0 5 mg by mouth 2 (two) times a day   zafirlukast (ACCOLATE) 20 MG tablet   Yes No   Sig: Take 20 mg by mouth 2 (two) times a day      Facility-Administered Medications: None       No Known Allergies    Objective     Vitals:    01/19/20 0706   BP: 147/96   Pulse: 89   Resp: 19   Temp: 98 1 °F (36 7 °C)   SpO2: 97%         Physical Exam   Constitutional: Oriented to person, place, and time  Well-developed and well-nourished, no apparent distress, non-toxic appearance  Cooperative, able to hear and answer questions without difficulty  Voice: Normal voice quality  Head: Normocephalic, atraumatic  No scars, masses or lesions  Face: Symmetric, no edema, no sinus tenderness  Eyes: Vision grossly intact, extra-ocular movement intact  Ears: External ears normal   No post-auricular erythema or tenderness  Nose: Septum intact, nares clear  Mucosa moist, turbinates well appearing  No crusting, polyps or discharge evident  Oral cavity: Dentition intact  Mucosa moist, lips without lesions or masses  Tongue mobile, floor of mouth soft and flat  Hard palate intact  No masses or lesions  Oropharynx: Uvula is midline, soft palate intact without lesion or mass  Oropharyngeal inlet without obstruction  Tonsils unremarkable  Posterior pharyngeal wall clear  No masses or lesions    Salivary glands: Parotid glands and submandibular glands symmetric, no enlargement or tenderness  Neck: Normal laryngeal elevation with swallow  Trachea midline  No masses or lesions  No palpable adenopathy  Thyroid: Without tenderness or palpable nodules  Pulmonary/Chest: Normal effort and rate  No respiratory distress  No stertor or stridor  Musculoskeletal: Normal range of motion  Neurological: Cranial nerves 2-12 intact  Skin: Skin is warm and dry  Psychiatric: Normal mood and affect  Bilateral packing was deflated 1st on the left and then on the right  He had no epistaxis  The packing was removed on the left with no epistaxis  The packing was then removed and the right also with no epistaxis  No posterior nasal bleeding  Intake/Output Summary (Last 24 hours) at 1/19/2020 1024  Last data filed at 1/19/2020 0730  Gross per 24 hour   Intake 1120 ml   Output --   Net 1120 ml       Invasive Devices     None                 Lab Results:   I have personally reviewed pertinent lab results  , CBC:   Lab Results   Component Value Date    WBC 4 99 01/19/2020    HGB 11 5 (L) 01/19/2020    HCT 35 0 (L) 01/19/2020    MCV 84 01/19/2020     01/19/2020    MCH 27 6 01/19/2020    MCHC 32 9 01/19/2020    RDW 13 6 01/19/2020    MPV 10 2 01/19/2020   , CMP:   Lab Results   Component Value Date    SODIUM 139 01/19/2020    K 3 5 01/19/2020     (H) 01/19/2020    CO2 23 01/19/2020    BUN 15 01/19/2020    CREATININE 1 48 (H) 01/19/2020    CALCIUM 8 8 01/19/2020    EGFR 55 01/19/2020   , Coags: No results found for: PT, PTT, INR  Imaging Studies: I have personally reviewed pertinent reports  EKG, Pathology, and Other Studies: I have personally reviewed pertinent reports        Code Status: Level 1 - Full Code  Advance Directive and Living Will:      Power of :    POLST:      None

## 2020-01-19 NOTE — NURSING NOTE
Patient discharged to home walking, accompanied by son in no acute distress or pain  AVS explained to patient prior to discharge

## 2020-01-20 VITALS
HEART RATE: 78 BPM | RESPIRATION RATE: 17 BRPM | WEIGHT: 245.1 LBS | TEMPERATURE: 98.2 F | SYSTOLIC BLOOD PRESSURE: 145 MMHG | BODY MASS INDEX: 29.85 KG/M2 | DIASTOLIC BLOOD PRESSURE: 56 MMHG | OXYGEN SATURATION: 94 % | HEIGHT: 76 IN

## 2020-01-20 NOTE — UTILIZATION REVIEW
Notification of Discharge  This is a Notification of Discharge from our facility [address]  Please be advised that this patient has been discharge from our facility  Below you will find the admission and discharge date and time including the patients disposition  PRESENTATION DATE: 1/18/2020  4:01 PM  OBS ADMISSION DATE:  IP ADMISSION DATE: 1/18/20 1601   DISCHARGE DATE: 1/19/2020  2:13 PM  DISPOSITION: Home/Self Care Home/Self Care   Admission Orders listed below:  Admission Orders (From admission, onward)     Ordered        01/18/20 1613  Inpatient Admission  Once                   Please contact the UR Department if additional information is required to close this patient's authorization/case  [address] Utilization Review Department  Main: [phone] x carefully listen to the prompts  All voicemails are confidential   [email or web address]  Send all requests for admission clinical reviews, approved or denied determinations and any other requests to dedicated fax number below belonging to the campus where the patient is receiving treatment   List of dedicated fax numbers:  [address] DENIALS (Administrative/Medical Necessity) [phone]   [address] (Maternity/NICU/Pediatrics) [phone]   Chuylinh Fabian [phone]   Marky Zully [phone]   Terell Gamez [phone]   [address] [place] [address] 1525 [facility] [phone]   1101 [facility] [phone]   2205 [facility]  2401 [facility] 1000 [facility] [phone]

## 2021-05-07 ENCOUNTER — APPOINTMENT (OUTPATIENT)
Dept: RADIOLOGY | Facility: CLINIC | Age: 71
End: 2021-05-07
Payer: COMMERCIAL

## 2021-05-07 ENCOUNTER — OFFICE VISIT (OUTPATIENT)
Dept: URGENT CARE | Facility: CLINIC | Age: 71
End: 2021-05-07
Payer: COMMERCIAL

## 2021-05-07 VITALS
SYSTOLIC BLOOD PRESSURE: 148 MMHG | WEIGHT: 246 LBS | BODY MASS INDEX: 29.96 KG/M2 | HEIGHT: 76 IN | RESPIRATION RATE: 22 BRPM | OXYGEN SATURATION: 96 % | DIASTOLIC BLOOD PRESSURE: 88 MMHG | HEART RATE: 74 BPM

## 2021-05-07 DIAGNOSIS — M25.511 ACUTE PAIN OF RIGHT SHOULDER: Primary | ICD-10-CM

## 2021-05-07 DIAGNOSIS — M25.511 ACUTE PAIN OF RIGHT SHOULDER: ICD-10-CM

## 2021-05-07 DIAGNOSIS — M79.621 PAIN OF RIGHT UPPER ARM: ICD-10-CM

## 2021-05-07 PROCEDURE — 73060 X-RAY EXAM OF HUMERUS: CPT

## 2021-05-07 PROCEDURE — 73030 X-RAY EXAM OF SHOULDER: CPT

## 2021-05-07 PROCEDURE — G0463 HOSPITAL OUTPT CLINIC VISIT: HCPCS | Performed by: PHYSICIAN ASSISTANT

## 2021-05-07 PROCEDURE — 99213 OFFICE O/P EST LOW 20 MIN: CPT | Performed by: PHYSICIAN ASSISTANT

## 2021-05-07 RX ORDER — CYCLOBENZAPRINE HCL 10 MG
10 TABLET ORAL 3 TIMES DAILY PRN
Qty: 20 TABLET | Refills: 0 | Status: SHIPPED | OUTPATIENT
Start: 2021-05-07

## 2021-05-07 NOTE — PATIENT INSTRUCTIONS
Arthralgia   WHAT YOU NEED TO KNOW:   Arthralgia is pain in one or more joints, with no inflammation  It may be short-term and get better within 6 to 8 weeks  Arthralgia can be an early sign of arthritis  Arthralgia may be caused by a medical condition, such as a hormone disorder or a tumor  It may also be caused by an infection or injury  DISCHARGE INSTRUCTIONS:   Medicines: The following medicines may  be ordered for you:  · Acetaminophen  decreases pain  Ask how much to take and how often to take it  Follow directions  Acetaminophen can cause liver damage if not taken correctly  · NSAIDs  decrease pain and prevent swelling  Ask your healthcare provider which medicine is right for you  Ask how much to take and when to take it  Take as directed  NSAIDs can cause stomach bleeding and kidney problems if not taken correctly  · Pain relief cream  decreases pain  Use this cream as directed  · Take your medicine as directed  Contact your healthcare provider if you think your medicine is not helping or if you have side effects  Tell him of her if you are allergic to any medicine  Keep a list of the medicines, vitamins, and herbs you take  Include the amounts, and when and why you take them  Bring the list or the pill bottles to follow-up visits  Carry your medicine list with you in case of an emergency  Follow up with your healthcare provider or specialist as directed:  Write down your questions so you remember to ask them during your visits  Self-care:   · Apply heat  to help decrease pain  Use a heating pad or heat wrap  Apply heat for 20 to 30 minutes every 2 hours for as many days as directed  · Rest  as much as possible  Avoid activities that cause joint pain  · Apply ice  to help decrease swelling and pain  Ice may also help prevent tissue damage  Use an ice pack, or put crushed ice in a plastic bag   Cover it with a towel and place it on your painful joint for 15 to 20 minutes every hour or as directed  · Support  the joint with a brace or elastic wrap as directed  · Elevate  your joint above the level of your heart as often as you can to help decrease swelling and pain  Prop your painful joint on pillows or blankets to keep it elevated comfortably  · Lose weight  if you are overweight  Extra weight can put pressure on your joints and cause more pain  Ask your healthcare provider how much you should weigh  Ask him to help you create a weight loss plan  · Exercise  regularly to help improve joint movement and to decrease pain  Ask about the best exercise plan for you  Low-impact exercises can help take the pressure off your joints  Examples are walking, swimming, and water aerobics  Physical therapy:  A physical therapist teaches you exercises to help improve movement and strength, and to decrease pain  Ask your healthcare provider if physical therapy is right for you  Contact your healthcare provider or specialist if:   · You have a fever  · You continue to have joint pain that cannot be relieved with heat, ice, or medicine  · You have pain and inflammation around your joint  · You have questions or concerns about your condition or care  Return to the emergency department if:   · You have sudden, severe pain when you move your joint  · You have a fever and shaking chills  · You cannot move your joint  · You lose feeling on the side of your body where you have the painful joint  © Copyright [address] Information is for End User's use only and may not be sold, redistributed or otherwise used for commercial purposes  All illustrations and images included in CareNotes® are the copyrighted property of A D A M , Inc  or [facility] Juliana Waddell   The above information is an  only  It is not intended as medical advice for individual conditions or treatments   Talk to your doctor, nurse or pharmacist before following any medical regimen to see if it is safe and effective for you

## 2021-05-07 NOTE — PROGRESS NOTES
[facility]        NAME: Malia Goel is a 72 y o  male  : 1950    MRN: [id number]  DATE: May 7, 2021  TIME: 3:29 PM    Assessment and Plan   Acute pain of right shoulder [M25 511]  1  Acute pain of right shoulder  XR shoulder 2+ vw right   2  Pain of right upper arm  XR humerus right         Patient Instructions       Follow up with PCP in 3-5 days  Proceed to  ER if symptoms worsen  Chief Complaint     Chief Complaint   Patient presents with    Arm Pain     right arm pain awoke pt out of sleep 3 days ago  ,pain from shoulder down to mid forearm   denies trauma          History of Present Illness         Patient presents with a 3 day history of right shoulder right upper arm pain  States he woke out of a sound sleep the pain  He denies injury  Did have a right shoulder replacement many years ago secondary to a fall  Denies any neck pain paresthesias of the right upper extremity  The pain is exacerbated by any movement of the right arm  Review of Systems   Review of Systems   Constitutional: Negative for chills and fever  Musculoskeletal: Positive for arthralgias and myalgias  Skin: Negative for rash  Neurological: Negative for weakness and numbness           Current Medications       Current Outpatient Medications:     albuterol (PROVENTIL HFA,VENTOLIN HFA) 90 mcg/act inhaler, Inhale 2 puffs 4 (four) times a day, Disp: , Rfl:     amLODIPine (NORVASC) 10 mg tablet, Take 10 mg by mouth daily, Disp: , Rfl:     buPROPion (WELLBUTRIN SR) 150 mg 12 hr tablet, Take 150 mg by mouth 2 (two) times a day, Disp: , Rfl:     clobetasol (TEMOVATE) 0 05 % ointment, Apply topically 2 (two) times a day, Disp: , Rfl:     cloNIDine (CATAPRES) 0 2 mg tablet, Take 0 2 mg by mouth daily, Disp: , Rfl:     diclofenac sodium (VOLTAREN) 1 %, Apply 2 g topically 4 (four) times a day, Disp: , Rfl:     fluticasone (FLOVENT HFA) 110 MCG/ACT inhaler, Inhale 2 puffs 2 (two) times a day Rinse mouth after use , Disp: , Rfl:     gabapentin (NEURONTIN) 300 mg capsule, Take 300 mg by mouth 2 (two) times a day, Disp: , Rfl:     hydrochlorothiazide (HYDRODIURIL) 25 mg tablet, Take 2 tablets (50 mg total) by mouth daily, Disp: 30 tablet, Rfl: 0    ipratropium-albuterol (DUO-NEB) 0 5-2 5 mg/3 mL nebulizer solution, Take 3 mL by nebulization 4 (four) times a day, Disp: , Rfl:     losartan (COZAAR) 100 MG tablet, Take 100 mg by mouth daily, Disp: , Rfl:     mometasone (NASONEX) 50 mcg/act nasal spray, 2 sprays into each nostril daily, Disp: , Rfl:     potassium chloride (K-DUR,KLOR-CON) 10 mEq tablet, Take 10 mEq by mouth daily, Disp: , Rfl:     predniSONE 20 mg tablet, Take 20 mg by mouth see administration instructions As directed, Disp: , Rfl:     simvastatin (ZOCOR) 20 mg tablet, Take 20 mg by mouth, Disp: , Rfl:     tadalafil (CIALIS) 20 MG tablet, Take 20 mg by mouth daily as needed for erectile dysfunction, Disp: , Rfl:     triamcinolone (KENALOG) 0 1 % lotion, Apply topically 3 (three) times a day, Disp: , Rfl:     varenicline (CHANTIX) 1 mg tablet, Take 0 5 mg by mouth 2 (two) times a day, Disp: , Rfl:     zafirlukast (ACCOLATE) 20 MG tablet, Take 20 mg by mouth 2 (two) times a day, Disp: , Rfl:     fluticasone-salmeterol (ADVAIR, WIXELA) 500-50 mcg/dose inhaler, Inhale 1 puff 2 (two) times a day Rinse mouth after use , Disp: , Rfl:     omeprazole (PriLOSEC) 20 mg delayed release capsule, Take 20 mg by mouth 2 (two) times a day, Disp: , Rfl:     Current Allergies     Allergies as of 05/07/2021    (No Known Allergies)            The following portions of the patient's history were reviewed and updated as appropriate: allergies, current medications, past family history, past medical history, past social history, past surgical history and problem list      Past Medical History:   Diagnosis Date    Asthma     COPD [facility]chronic obstructive pulmonary disease) ([facility] )     Diabetes mellitus ([facility] )     Hyperlipidemia     Hypertension        Past Surgical History:   Procedure Laterality Date    SHOULDER ARTHROPLASTY Right     TOTAL HIP ARTHROPLASTY Right        No family history on file  Medications have been verified  Objective   /88 (BP Location: Left arm, Patient Position: Sitting, Cuff Size: Extra-Large)   Pulse 74   Resp 22   Ht 6' 4" (1 93 m)   Wt 112 kg (246 lb)   SpO2 96%   BMI 29 94 kg/m²   No LMP for male patient  Physical Exam     Physical Exam  Vitals signs and nursing note reviewed  Constitutional:       Appearance: Normal appearance  HENT:      Head: Normocephalic and atraumatic  Cardiovascular:      Rate and Rhythm: Normal rate and regular rhythm  Pulmonary:      Effort: Pulmonary effort is normal    Musculoskeletal:      Comments: Right shoulder without any ecchymosis erythema rashes or swelling  There is tenderness to palpation over the posterior shoulder acromioclavicular joint extending down through the mid humerus  Diminished range of motion in all planes secondary to pain  Skin:     General: Skin is warm  Neurological:      Mental Status: He is alert  Right shoulder x-ray -  No acute bony abnormality, hardware appears intact  Right humerus x-ray -   No acute bony abnormality

## 2022-03-03 ENCOUNTER — OFFICE VISIT (OUTPATIENT)
Dept: URGENT CARE | Facility: CLINIC | Age: 72
End: 2022-03-03
Payer: COMMERCIAL

## 2022-03-03 ENCOUNTER — APPOINTMENT (OUTPATIENT)
Dept: RADIOLOGY | Facility: CLINIC | Age: 72
End: 2022-03-03
Payer: COMMERCIAL

## 2022-03-03 VITALS
SYSTOLIC BLOOD PRESSURE: 137 MMHG | DIASTOLIC BLOOD PRESSURE: 79 MMHG | WEIGHT: 246 LBS | OXYGEN SATURATION: 97 % | HEART RATE: 69 BPM | RESPIRATION RATE: 20 BRPM | TEMPERATURE: 98 F | BODY MASS INDEX: 29.94 KG/M2

## 2022-03-03 DIAGNOSIS — M79.604 ACUTE PAIN OF RIGHT LOWER EXTREMITY: ICD-10-CM

## 2022-03-03 DIAGNOSIS — S93.491A SPRAIN OF OTHER LIGAMENT OF RIGHT ANKLE, INITIAL ENCOUNTER: Primary | ICD-10-CM

## 2022-03-03 DIAGNOSIS — S82.141A TIBIAL PLATEAU FRACTURE, RIGHT, CLOSED, INITIAL ENCOUNTER: ICD-10-CM

## 2022-03-03 DIAGNOSIS — M25.461 EFFUSION OF RIGHT KNEE: ICD-10-CM

## 2022-03-03 DIAGNOSIS — S90.30XA CONTUSION OF DORSUM OF FOOT: ICD-10-CM

## 2022-03-03 PROCEDURE — 99213 OFFICE O/P EST LOW 20 MIN: CPT | Performed by: NURSE PRACTITIONER

## 2022-03-03 PROCEDURE — 73630 X-RAY EXAM OF FOOT: CPT

## 2022-03-03 PROCEDURE — G0463 HOSPITAL OUTPT CLINIC VISIT: HCPCS | Performed by: NURSE PRACTITIONER

## 2022-03-03 PROCEDURE — 73564 X-RAY EXAM KNEE 4 OR MORE: CPT

## 2022-03-03 PROCEDURE — 73610 X-RAY EXAM OF ANKLE: CPT

## 2022-03-03 NOTE — PATIENT INSTRUCTIONS
Your xray was preliminarily read by your provider  A radiologist will read the xray and you will be notified if it is abnormal     You are to Rest, Ice, compression (ace wrap, splint) elevate  Take tylenol  needed  You are to see your PCP   Go to the ED if symptoms worsen  Contusion in Adults   WHAT YOU NEED TO KNOW:   A contusion is a bruise that appears on your skin after an injury  A bruise happens when small blood vessels tear but skin does not  Blood leaks into nearby tissue, such as soft tissue or muscle  DISCHARGE INSTRUCTIONS:   Return to the emergency department if:   · You have new trouble moving the injured area  · You have tingling or numbness in or near the injured area  · Your hand or foot below the bruise gets cold or turns pale  Call your doctor if:   · You find a new lump in the injured area  · Your symptoms do not improve with treatment after 4 to 5 days  · You have questions or concerns about your condition or care  Medicines: You may need any of the following:  · NSAIDs  help decrease swelling and pain or fever  This medicine is available with or without a doctor's order  NSAIDs can cause stomach bleeding or kidney problems in certain people  If you take blood thinner medicine, always ask your healthcare provider if NSAIDs are safe for you  Always read the medicine label and follow directions  · Prescription pain medicine  may be given  Ask your healthcare provider how to take this medicine safely  Some prescription pain medicines contain acetaminophen  Do not take other medicines that contain acetaminophen without talking to your healthcare provider  Too much acetaminophen may cause liver damage  Prescription pain medicine may cause constipation  Ask your healthcare provider how to prevent or treat constipation  · Take your medicine as directed  Contact your healthcare provider if you think your medicine is not helping or if you have side effects   Tell him of her if you are allergic to any medicine  Keep a list of the medicines, vitamins, and herbs you take  Include the amounts, and when and why you take them  Bring the list or the pill bottles to follow-up visits  Carry your medicine list with you in case of an emergency  Help a contusion heal:   · Rest the injured area  or use it less than usual  If you bruised your leg or foot, you may need crutches or a cane to help you walk  This will help you keep weight off your injured body part  · Apply ice  to decrease swelling and pain  Ice may also help prevent tissue damage  Use an ice pack, or put crushed ice in a plastic bag  Cover it with a towel and place it on your bruise for 15 to 20 minutes every hour or as directed  · Use compression  to support the area and decrease swelling  Wrap an elastic bandage around the area over the bruised muscle  Make sure the bandage is not too tight  You should be able to fit 1 finger between the bandage and your skin  · Elevate (raise) your injured body part  above the level of your heart to help decrease pain and swelling  Use pillows, blankets, or rolled towels to elevate the area as often as you can  · Do not drink alcohol  as directed  Alcohol may slow healing  · Do not stretch injured muscles  right after your injury  Ask your healthcare provider when and how you may safely stretch after your injury  Gentle stretches can help increase your flexibility  · Do not massage the area or put heating pads  on the bruise right after your injury  Heat and massage may slow healing  Your healthcare provider may tell you to apply heat after several days  At that time, heat will start to help the injury heal     Prevent another contusion:   · Stretch and warm up before you play sports or exercise  · Wear protective gear when you play sports  Examples are shin guards and padding       · If you begin a new physical activity, start slowly to give your body a chance to adjust     Follow up with your doctor as directed:  Write down your questions so you remember to ask them during your visits  © Copyright [employer] 2022 Information is for End User's use only and may not be sold, redistributed or otherwise used for commercial purposes  All illustrations and images included in CareNotes® are the copyrighted property of [employer] M , Inc  or [employer]  The above information is an  only  It is not intended as medical advice for individual conditions or treatments  Talk to your doctor, nurse or pharmacist before following any medical regimen to see if it is safe and effective for you  Swollen Knee Joint   WHAT YOU NEED TO KNOW:   A swollen knee joint may be caused by arthritis or by an injury or trauma, such as a knee sprain  It may also happen if you exercise too much  It may be painful to bend or straighten your knee, or walk  DISCHARGE INSTRUCTIONS:   Return to the emergency department if:   · Your knee locks or gives way and you fall  · Your feet or toes start to look pale or feel cold  · You cannot bear weight on your leg, or you have severe pain even after treatment  Contact your healthcare provider if:   · You have a fever  · You have redness or warmth over your knee  · The swelling does not decrease with treatment  · It gets harder or more painful to straighten your leg at the knee  · Your knee weakens, or you continue to limp  · You have questions or concerns about your condition or care  Medicines:   · NSAIDs , such as ibuprofen, help decrease swelling, pain, and fever  This medicine is available with or without a doctor's order  NSAIDs can cause stomach bleeding or kidney problems in certain people  If you take blood thinner medicine, always ask your healthcare provider if NSAIDs are safe for you  Always read the medicine label and follow directions  · Take your medicine as directed    Contact your healthcare provider if you think your medicine is not helping or if you have side effects  Tell him of her if you are allergic to any medicine  Keep a list of the medicines, vitamins, and herbs you take  Include the amounts, and when and why you take them  Bring the list or the pill bottles to follow-up visits  Carry your medicine list with you in case of an emergency  What you can do to manage your symptoms:   · Rest your knee  Avoid activities that make the swelling or pain worse  You may need to avoid putting weight on your knee while you have pain  Crutches, a cane, or a walker can be used to avoid putting weight on your knee while it heals  · Apply ice to your knee to help relieve pain and swelling  Apply ice for 15 to 20 minutes every hour or as directed  Use an ice pack, or put crushed ice in a plastic bag  Cover it with a towel before you apply it to your knee  Ice helps prevent tissue damage and decreases swelling and pain  · Compress your knee with a brace or bandage to help reduce swelling  Use a brace or bandage only as directed  · Elevate your knee above the level of your heart as often as you can  This will help decrease swelling and pain  Prop your joint on pillows or blankets to keep it elevated comfortably  · Apply heat to your knee to relieve pain  Apply heat for 20 to 30 minutes every 2 hours for as many days as directed  Heat helps decrease pain  · Go to physical therapy if directed  A physical therapist teaches you exercises to help improve movement and strength, and to decrease pain  Follow up with your doctor as directed:  Write down your questions so you remember to ask them during your visits  © Copyright [employer] 2022 Information is for End User's use only and may not be sold, redistributed or otherwise used for commercial purposes   All illustrations and images included in CareNotes® are the copyrighted property of [employer]  or [employer]  The above information is an  only  It is not intended as medical advice for individual conditions or treatments  Talk to your doctor, nurse or pharmacist before following any medical regimen to see if it is safe and effective for you

## 2022-03-03 NOTE — PROGRESS NOTES
[facility]        NAME: Flory Ledesma is a 80 y o  male  : 1950    MRN: [id number]  DATE: March 3, 2022  TIME: 1:40 PM    Assessment and Plan   Sprain of other ligament of right ankle, initial encounter [S91 494U]  1  Sprain of other ligament of right ankle, initial encounter     2  Effusion of right knee     3  Contusion of dorsum of foot      right      4  Acute pain of right lower extremity  XR knee 4+ vw right injury    XR foot 3+ vw right    XR ankle 3+ vw right         Patient Instructions       Follow up with PCP in 3-5 days  Proceed to  ER if symptoms worsen  Your xray was preliminarily read by your provider  A radiologist will read the xray and you will be notified if it is abnormal     You are to Rest, Ice, compression (ace wrap, splint) elevate  Take tylenol  needed  You are to see your PCP   Go to the ED if symptoms worsen  Chief Complaint     Chief Complaint   Patient presents with    Leg Pain     fall , down stairs         History of Present Illness       This is a 80year old male who states fell twice over the weekend injuring his right knee and ankle and foot  He states he has been taking tylenol with little relief  Denies having an orthopedist    Denies any other injuries  Review of Systems   Review of Systems   Constitutional: Negative  HENT: Negative  Eyes: Negative  Respiratory: Negative  Cardiovascular: Negative  Gastrointestinal: Negative  Endocrine: Negative  Genitourinary: Negative  Musculoskeletal:        Right knee, ankle and foot    Skin: Negative  Allergic/Immunologic: Negative  Neurological: Negative  Hematological: Negative  Psychiatric/Behavioral: Negative            Current Medications       Current Outpatient Medications:     albuterol (PROVENTIL HFA,VENTOLIN HFA) 90 mcg/act inhaler, Inhale 2 puffs 4 (four) times a day, Disp: , Rfl:     amLODIPine (NORVASC) 10 mg tablet, Take 10 mg by mouth daily, Disp: , Rfl:   buPROPion (WELLBUTRIN SR) 150 mg 12 hr tablet, Take 150 mg by mouth 2 (two) times a day, Disp: , Rfl:     clobetasol (TEMOVATE) 0 05 % ointment, Apply topically 2 (two) times a day, Disp: , Rfl:     cloNIDine (CATAPRES) 0 2 mg tablet, Take 0 2 mg by mouth daily, Disp: , Rfl:     fluticasone (FLOVENT HFA) 110 MCG/ACT inhaler, Inhale 2 puffs 2 (two) times a day Rinse mouth after use , Disp: , Rfl:     fluticasone-salmeterol (ADVAIR, WIXELA) 500-50 mcg/dose inhaler, Inhale 1 puff 2 (two) times a day Rinse mouth after use , Disp: , Rfl:     gabapentin (NEURONTIN) 300 mg capsule, Take 300 mg by mouth 2 (two) times a day, Disp: , Rfl:     hydrochlorothiazide (HYDRODIURIL) 25 mg tablet, Take 2 tablets (50 mg total) by mouth daily, Disp: 30 tablet, Rfl: 0    ipratropium-albuterol (DUO-NEB) 0 5-2 5 mg/3 mL nebulizer solution, Take 3 mL by nebulization 4 (four) times a day, Disp: , Rfl:     losartan (COZAAR) 100 MG tablet, Take 100 mg by mouth daily, Disp: , Rfl:     mometasone (NASONEX) 50 mcg/act nasal spray, 2 sprays into each nostril daily, Disp: , Rfl:     omeprazole (PriLOSEC) 20 mg delayed release capsule, Take 20 mg by mouth 2 (two) times a day, Disp: , Rfl:     potassium chloride (K-DUR,KLOR-CON) 10 mEq tablet, Take 10 mEq by mouth daily, Disp: , Rfl:     predniSONE 20 mg tablet, Take 20 mg by mouth see administration instructions As directed, Disp: , Rfl:     simvastatin (ZOCOR) 20 mg tablet, Take 20 mg by mouth, Disp: , Rfl:     tadalafil (CIALIS) 20 MG tablet, Take 20 mg by mouth daily as needed for erectile dysfunction, Disp: , Rfl:     zafirlukast (ACCOLATE) 20 MG tablet, Take 20 mg by mouth 2 (two) times a day, Disp: , Rfl:     cyclobenzaprine (FLEXERIL) 10 mg tablet, Take 1 tablet (10 mg total) by mouth 3 (three) times a day as needed for muscle spasms (Patient not taking: Reported on 3/3/2022 ), Disp: 20 tablet, Rfl: 0    diclofenac sodium (VOLTAREN) 1 %, Apply 2 g topically 4 (four) times a day (Patient not taking: Reported on 3/3/2022 ), Disp: , Rfl:     triamcinolone (KENALOG) 0 1 % lotion, Apply topically 3 (three) times a day (Patient not taking: Reported on 3/3/2022 ), Disp: , Rfl:     varenicline (CHANTIX) 1 mg tablet, Take 0 5 mg by mouth 2 (two) times a day (Patient not taking: Reported on 3/3/2022 ), Disp: , Rfl:     Current Allergies     Allergies as of 03/03/2022 - Reviewed 03/03/2022   Allergen Reaction Noted    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            The following portions of the patient's history were reviewed and updated as appropriate: allergies, current medications, past family history, past medical history, past social history, past surgical history and problem list      Past Medical History:   Diagnosis Date    Asthma     [facility]ronic [facility]     [facility] mellitus [facility] )     Hyperlipidemia     Hypertension        Past Surgical History:   Procedure Laterality Date    SHOULDER ARTHROPLASTY Right     TOTAL HIP ARTHROPLASTY Right        History reviewed  No pertinent family history  Medications have been verified  Objective   /79   Pulse 69   Temp 98 °F (36 7 °C)   Resp 20   Wt 112 kg (246 lb)   SpO2 97%   BMI 29 94 kg/m²   No LMP for male patient  Physical Exam     Physical Exam  Vitals and nursing note reviewed  Constitutional:       General: He is not in acute distress  Appearance: Normal appearance  He is obese  He is not ill-appearing, toxic-appearing or diaphoretic  HENT:      Head: Normocephalic and atraumatic  Eyes:      Extraocular Movements: Extraocular movements intact  Cardiovascular:      Rate and Rhythm: Normal rate  Pulses: Normal pulses  Pulmonary:      Effort: Pulmonary effort is normal    Musculoskeletal:         General: Tenderness and signs of injury present  No swelling  Normal range of motion        Cervical back: Normal range of motion  Comments: Right knee TTP at bilateral joint line  No edema, crepitus, neg lachmans or drawer  Right ankle slightly swollen and TTP  Right proximal dorsal foot is TTP, FROM of ankle and foot  No foot edema  Able to wiggle toes  Muscle strength 4/5      + pedal pulse    Skin:     General: Skin is warm and dry  Capillary Refill: Capillary refill takes less than 2 seconds  Neurological:      General: No focal deficit present  Mental Status: He is alert and oriented to person, place, and time  Psychiatric:         Mood and Affect: Mood normal          Behavior: Behavior normal          Thought Content: Thought content normal          Judgment: Judgment normal          Knee xray  ? Effusion  Waiting on rad read    Ankle and foot - no acute process seen  Waiting on rad read        3/4/2022  8:10 AM  Knee xray with tibial plateau fracture of undetermined age  Called pt and informed him of abnormal reading of xray  Informed him that there is an ortho referral done for him and that he needs a knee immobilizer  He states that he will try to get in today

## 2022-03-04 ENCOUNTER — TELEPHONE (OUTPATIENT)
Dept: URGENT CARE | Facility: CLINIC | Age: 72
End: 2022-03-04

## 2022-03-04 NOTE — TELEPHONE ENCOUNTER
Pt here for knee immobilizer due to abnormal xray read of right knee on 3/3/2022  Ortho referral, and names of ortho given to pt  He verbalizes understanding

## 2022-03-08 ENCOUNTER — CONSULT (OUTPATIENT)
Dept: OBGYN CLINIC | Facility: CLINIC | Age: 72
End: 2022-03-08
Payer: COMMERCIAL

## 2022-03-08 VITALS
BODY MASS INDEX: 29.96 KG/M2 | HEIGHT: 76 IN | HEART RATE: 86 BPM | DIASTOLIC BLOOD PRESSURE: 74 MMHG | WEIGHT: 246 LBS | SYSTOLIC BLOOD PRESSURE: 169 MMHG

## 2022-03-08 DIAGNOSIS — M79.604 ACUTE PAIN OF RIGHT LOWER EXTREMITY: ICD-10-CM

## 2022-03-08 DIAGNOSIS — M25.461 EFFUSION OF RIGHT KNEE: ICD-10-CM

## 2022-03-08 DIAGNOSIS — M17.11 PRIMARY OSTEOARTHRITIS OF RIGHT KNEE: Primary | ICD-10-CM

## 2022-03-08 PROCEDURE — 20610 DRAIN/INJ JOINT/BURSA W/O US: CPT | Performed by: PHYSICIAN ASSISTANT

## 2022-03-08 PROCEDURE — 99203 OFFICE O/P NEW LOW 30 MIN: CPT | Performed by: PHYSICIAN ASSISTANT

## 2022-03-08 RX ORDER — BUPIVACAINE HYDROCHLORIDE 2.5 MG/ML
4 INJECTION, SOLUTION INFILTRATION; PERINEURAL
Status: COMPLETED | OUTPATIENT
Start: 2022-03-08 | End: 2022-03-08

## 2022-03-08 RX ORDER — TRIAMCINOLONE ACETONIDE 40 MG/ML
80 INJECTION, SUSPENSION INTRA-ARTICULAR; INTRAMUSCULAR
Status: COMPLETED | OUTPATIENT
Start: 2022-03-08 | End: 2022-03-08

## 2022-03-08 RX ADMIN — TRIAMCINOLONE ACETONIDE 80 MG: 40 INJECTION, SUSPENSION INTRA-ARTICULAR; INTRAMUSCULAR at 11:30

## 2022-03-08 RX ADMIN — BUPIVACAINE HYDROCHLORIDE 4 ML: 2.5 INJECTION, SOLUTION INFILTRATION; PERINEURAL at 11:30

## 2022-03-08 NOTE — PROGRESS NOTES
ASSESSMENT/PLAN:    Diagnoses and all orders for this visit:    Primary osteoarthritis of right knee    Acute pain of right lower extremity  -     Ambulatory Referral to Orthopedic Surgery    Effusion of right knee  -     Ambulatory Referral to Orthopedic Surgery    Other orders  -     Large joint arthrocentesis        X-rays of the patient's right knee consistent with an old tibial plateau fracture  Possible treatment options were discussed with the patient  An aspiration was performed which consistent with 32 cc of clear, synovial fluid  There was no blood present which could be indicative of a more acute fracture  His knee was then injected with Kenalog and Marcaine  He will follow up with our office in 2 months  The patient is acceptable to this plan  Return in about 2 months (around 5/8/2022)  Under aseptic technique, the right knee was aspirated 32 cc of clear synovial fluid  It was injected with Kenalog and Marcaine  He tolerated procedure quite well  Return back in 2 months for evaluation  He may discontinue the brace  The x-rays show an old fracture present      _____________________________________________________  CHIEF COMPLAINT:  Chief Complaint   Patient presents with    Right Knee - Pain         SUBJECTIVE:  Kirstin Ozuna is a 80 y o  male who presents to our office complaining of right knee pain and swelling  The patient states he has had knee pain on and off for the last 3 months  He states that the last few days he has had several falls  He went to the urgent care where x-rays were performed  Those x-rays were consistent with a suspected acute tibial plateau fracture  He denies any numbness or tingling  He denies any fever or chills         The following portions of the patient's history were reviewed and updated as appropriate: allergies, current medications, past family history, past medical history, past social history, past surgical history and problem list     PAST MEDICAL HISTORY:  Past Medical History:   Diagnosis Date    Asthma     [facility]     [facility]     Hyperlipidemia     Hypertension        PAST SURGICAL HISTORY:  Past Surgical History:   Procedure Laterality Date    SHOULDER ARTHROPLASTY Right     TOTAL HIP ARTHROPLASTY Right        FAMILY HISTORY:  History reviewed  No pertinent family history      SOCIAL HISTORY:  Social History     Tobacco Use    Smoking status: Current Every Day Smoker     Types: Cigars    Smokeless tobacco: Never Used    Tobacco comment: 3 cigars    Substance Use Topics    Alcohol use: Yes     Comment: social    Drug use: Never       MEDICATIONS:    Current Outpatient Medications:     albuterol (PROVENTIL HFA,VENTOLIN HFA) 90 mcg/act inhaler, Inhale 2 puffs 4 (four) times a day, Disp: , Rfl:     amLODIPine (NORVASC) 10 mg tablet, Take 10 mg by mouth daily, Disp: , Rfl:     buPROPion (WELLBUTRIN SR) 150 mg 12 hr tablet, Take 150 mg by mouth 2 (two) times a day, Disp: , Rfl:     clobetasol (TEMOVATE) 0 05 % ointment, Apply topically 2 (two) times a day, Disp: , Rfl:     cloNIDine (CATAPRES) 0 2 mg tablet, Take 0 2 mg by mouth daily, Disp: , Rfl:     fluticasone (FLOVENT HFA) 110 MCG/ACT inhaler, Inhale 2 puffs 2 (two) times a day Rinse mouth after use , Disp: , Rfl:     fluticasone-salmeterol (ADVAIR, WIXELA) 500-50 mcg/dose inhaler, Inhale 1 puff 2 (two) times a day Rinse mouth after use , Disp: , Rfl:     gabapentin (NEURONTIN) 300 mg capsule, Take 300 mg by mouth 2 (two) times a day, Disp: , Rfl:     hydrochlorothiazide (HYDRODIURIL) 25 mg tablet, Take 2 tablets (50 mg total) by mouth daily, Disp: 30 tablet, Rfl: 0    ipratropium-albuterol (DUO-NEB) 0 5-2 5 mg/3 mL nebulizer solution, Take 3 mL by nebulization 4 (four) times a day, Disp: , Rfl:     losartan (COZAAR) 100 MG tablet, Take 100 mg by mouth daily, Disp: , Rfl:     mometasone (NASONEX) 50 mcg/act nasal spray, 2 sprays into each nostril daily, Disp: , Rfl:     omeprazole (PriLOSEC) 20 mg delayed release capsule, Take 20 mg by mouth 2 (two) times a day, Disp: , Rfl:     potassium chloride (K-DUR,KLOR-CON) 10 mEq tablet, Take 10 mEq by mouth daily, Disp: , Rfl:     predniSONE 20 mg tablet, Take 20 mg by mouth see administration instructions As directed, Disp: , Rfl:     simvastatin (ZOCOR) 20 mg tablet, Take 20 mg by mouth, Disp: , Rfl:     tadalafil (CIALIS) 20 MG tablet, Take 20 mg by mouth daily as needed for erectile dysfunction, Disp: , Rfl:     zafirlukast (ACCOLATE) 20 MG tablet, Take 20 mg by mouth 2 (two) times a day, Disp: , Rfl:     cyclobenzaprine (FLEXERIL) 10 mg tablet, Take 1 tablet (10 mg total) by mouth 3 (three) times a day as needed for muscle spasms (Patient not taking: Reported on 3/3/2022 ), Disp: 20 tablet, Rfl: 0    diclofenac sodium (VOLTAREN) 1 %, Apply 2 g topically 4 (four) times a day (Patient not taking: Reported on 3/3/2022 ), Disp: , Rfl:     triamcinolone (KENALOG) 0 1 % lotion, Apply topically 3 (three) times a day (Patient not taking: Reported on 3/3/2022 ), Disp: , Rfl:     varenicline (CHANTIX) 1 mg tablet, Take 0 5 mg by mouth 2 (two) times a day (Patient not taking: Reported on 3/3/2022 ), Disp: , Rfl:     ALLERGIES:  Allergies   Allergen Reactions    Shrimp Extract Allergy Skin Test - Food Allergy Anaphylaxis     And Lobster  Throat closes       ROS:  Review of Systems     Constitutional: Negative for fatigue, fever or loss of appetite  HENT: Negative  Respiratory: Negative for shortness of breath, dyspnea  Cardiovascular: Negative for chest pain/tightness  Gastrointestinal: Negative for abdominal pain, N/V  Endocrine: Negative for cold/heat intolerance, unexplained weight loss/gain  Genitourinary: Negative for flank pain, dysuria, hematuria  Musculoskeletal: Positive for arthralgia   Skin: Negative for rash      Neurological: Negative for numbness or tingling  Psychiatric/Behavioral: Negative for agitation  _____________________________________________________  PHYSICAL EXAMINATION:    Blood pressure 169/74, pulse 86, height 6' 4" (1 93 m), weight 112 kg (246 lb)  Constitutional: Oriented to person, place, and time  Appears well-developed and well-nourished  No distress  HENT:   Head: Normocephalic  Eyes: Conjunctivae are normal  Right eye exhibits no discharge  Left eye exhibits no discharge  No scleral icterus  Cardiovascular: Normal rate  Pulmonary/Chest: Effort normal    Neurological: Alert and oriented to person, place, and time  Skin: Skin is warm and dry  No rash noted  Not diaphoretic  No erythema  No pallor  Psychiatric: Normal mood and affect  Behavior is normal  Judgment and thought content normal       MUSCULOSKELETAL EXAMINATION:   Physical Exam  Ortho Exam    Right lower extremity is neurovascular intact  Toes are pink and mobile  Compartments are soft  After aspiration of the knee range of motion of the knee is improved from 0 to 125 degrees   Negative Lachman, drawer or pivot shift  Brisk cap refill  Sensation intact  Medial joint line tenderness with slight lateral joint line tenderness    Objective:  BP Readings from Last 1 Encounters:   03/08/22 169/74      Wt Readings from Last 1 Encounters:   03/08/22 112 kg (246 lb)        BMI:   Estimated body mass index is 29 94 kg/m² as calculated from the following:    Height as of this encounter: 6' 4" (1 93 m)  Weight as of this encounter: 112 kg (246 lb)  Radiographs:  _____________________________________________________  STUDIES REVIEWED:  I have personally reviewed pertinent films and reports in PACS  X-rays of the patient's right knee consistent with an old tibial plateau fracture      PROCEDURES PERFORMED:  Large joint arthrocentesis: R knee  Universal Protocol:  Risks and benefits: risks, benefits and alternatives were discussed  Consent given by: patient  Patient understanding: patient states understanding of the procedure being performed  Site marked: the operative site was marked  Supporting Documentation  Indications: pain   Procedure Details  Location: knee - R knee  Preparation: Patient was prepped and draped in the usual sterile fashion  Needle size: 22 G  Ultrasound guidance: no  Approach: lateral  Medications administered: 4 mL bupivacaine 0 25 %; 80 mg triamcinolone acetonide 40 mg/mL    Aspirate amount: 32 mL  Aspirate: clear and yellow    Patient tolerance: patient tolerated the procedure well with no immediate complications  Dressing:  Sterile dressing applied            Brian Lopez PA-C

## 2022-09-01 ENCOUNTER — APPOINTMENT (OUTPATIENT)
Dept: RADIOLOGY | Facility: CLINIC | Age: 72
End: 2022-09-01
Payer: COMMERCIAL

## 2022-09-01 ENCOUNTER — CONSULT (OUTPATIENT)
Dept: OBGYN CLINIC | Facility: CLINIC | Age: 72
End: 2022-09-01
Payer: COMMERCIAL

## 2022-09-01 VITALS
SYSTOLIC BLOOD PRESSURE: 111 MMHG | DIASTOLIC BLOOD PRESSURE: 73 MMHG | HEART RATE: 73 BPM | HEIGHT: 76 IN | BODY MASS INDEX: 27.06 KG/M2 | WEIGHT: 222.2 LBS

## 2022-09-01 DIAGNOSIS — M25.561 ACUTE PAIN OF RIGHT KNEE: ICD-10-CM

## 2022-09-01 DIAGNOSIS — M25.562 ACUTE PAIN OF LEFT KNEE: ICD-10-CM

## 2022-09-01 DIAGNOSIS — M17.12 PRIMARY OSTEOARTHRITIS OF LEFT KNEE: ICD-10-CM

## 2022-09-01 DIAGNOSIS — M17.11 PRIMARY OSTEOARTHRITIS OF RIGHT KNEE: Primary | ICD-10-CM

## 2022-09-01 PROCEDURE — 73562 X-RAY EXAM OF KNEE 3: CPT

## 2022-09-01 PROCEDURE — 99213 OFFICE O/P EST LOW 20 MIN: CPT | Performed by: PHYSICIAN ASSISTANT

## 2022-09-01 RX ORDER — POTASSIUM CHLORIDE 750 MG/1
TABLET, FILM COATED, EXTENDED RELEASE ORAL
COMMUNITY
Start: 2022-06-14

## 2022-09-01 RX ORDER — TRIAMCINOLONE ACETONIDE 5 MG/G
CREAM TOPICAL
COMMUNITY
Start: 2022-08-07

## 2022-09-01 NOTE — PROGRESS NOTES
ASSESSMENT/PLAN:    Diagnoses and all orders for this visit:    Primary osteoarthritis of right knee  -     XR knee 3 vw right non injury; Future  -     Injection Procedure Prior Authorization; Future    Primary osteoarthritis of left knee  -     XR knee 3 vw left non injury; Future  -     Injection Procedure Prior Authorization; Future    Other orders  -     potassium chloride (Klor-Con) 10 mEq tablet; TAKE ONE TABLET BY MOUTH DAILY AT 9AM (Patient not taking: Reported on 9/1/2022)  -     triamcinolone (KENALOG) 0 5 % cream; APPLY 1 APPLICATION TOPICALLY 3 (THREE) TIMES A DAY        X-rays of both of the patient's knees are consistent with advanced arthritic changes  There is minimal joint space remaining  Possible treatment options were discussed with the patient including corticosteroid injections, viscosupplementation, and/or knee replacement  The patient would like to proceed with viscosupplementation  We will get him approved through insurance, and he will follow up with our office upon approval   The patient is acceptable to this plan  Return for Viscosupplementation  _____________________________________________________  CHIEF COMPLAINT:  Chief Complaint   Patient presents with    Left Knee - Pain    Right Knee - Pain         SUBJECTIVE:  Kirstin Ozuna is a 80 y o  male who presents to our office for a follow-up visit  The patient is complaining of bilateral knee pain with mechanical instability  The patient states he has had several falls in the past   His pain is worsened with activity  He denies any numbness or tingling  He denies any fever or chills      The following portions of the patient's history were reviewed and updated as appropriate: allergies, current medications, past family history, past medical history, past social history, past surgical history and problem list     PAST MEDICAL HISTORY:  Past Medical History:   Diagnosis Date    Asthma     [facility]     [facility]     Hyperlipidemia     Hypertension        PAST SURGICAL HISTORY:  Past Surgical History:   Procedure Laterality Date    SHOULDER ARTHROPLASTY Right     TOTAL HIP ARTHROPLASTY Right        FAMILY HISTORY:  History reviewed  No pertinent family history      SOCIAL HISTORY:  Social History     Tobacco Use    Smoking status: Current Every Day Smoker     Packs/day: 0 25     Types: Cigars    Smokeless tobacco: Never Used    Tobacco comment: 3 cigars  and pipe   Vaping Use    Vaping Use: Never used   Substance Use Topics    Alcohol use: Yes     Comment: occasional weekend use    Drug use: Never       MEDICATIONS:    Current Outpatient Medications:     albuterol (PROVENTIL HFA,VENTOLIN HFA) 90 mcg/act inhaler, Inhale 2 puffs 4 (four) times a day, Disp: , Rfl:     amLODIPine (NORVASC) 10 mg tablet, Take 10 mg by mouth daily, Disp: , Rfl:     buPROPion (WELLBUTRIN SR) 150 mg 12 hr tablet, Take 150 mg by mouth 2 (two) times a day, Disp: , Rfl:     clobetasol (TEMOVATE) 0 05 % ointment, Apply topically 2 (two) times a day, Disp: , Rfl:     cloNIDine (CATAPRES) 0 2 mg tablet, Take 0 2 mg by mouth daily, Disp: , Rfl:     cyclobenzaprine (FLEXERIL) 10 mg tablet, Take 1 tablet (10 mg total) by mouth 3 (three) times a day as needed for muscle spasms, Disp: 20 tablet, Rfl: 0    diclofenac sodium (VOLTAREN) 1 %, Apply 2 g topically 4 (four) times a day, Disp: , Rfl:     fluticasone (FLOVENT HFA) 110 MCG/ACT inhaler, Inhale 2 puffs 2 (two) times a day Rinse mouth after use , Disp: , Rfl:     fluticasone-salmeterol (ADVAIR, WIXELA) 500-50 mcg/dose inhaler, Inhale 1 puff 2 (two) times a day Rinse mouth after use , Disp: , Rfl:     gabapentin (NEURONTIN) 300 mg capsule, Take 300 mg by mouth 2 (two) times a day, Disp: , Rfl:     hydrochlorothiazide (HYDRODIURIL) 25 mg tablet, Take 2 tablets (50 mg total) by mouth daily, Disp: 30 tablet, Rfl: 0    ipratropium-albuterol (DUO-NEB) 0 5-2 5 mg/3 mL nebulizer solution, Take 3 mL by nebulization 4 (four) times a day, Disp: , Rfl:     losartan (COZAAR) 100 MG tablet, Take 100 mg by mouth daily, Disp: , Rfl:     mometasone (NASONEX) 50 mcg/act nasal spray, 2 sprays into each nostril daily, Disp: , Rfl:     omeprazole (PriLOSEC) 20 mg delayed release capsule, Take 20 mg by mouth 2 (two) times a day, Disp: , Rfl:     potassium chloride (K-DUR,KLOR-CON) 10 mEq tablet, Take 10 mEq by mouth daily, Disp: , Rfl:     predniSONE 20 mg tablet, Take 20 mg by mouth see administration instructions As directed, Disp: , Rfl:     simvastatin (ZOCOR) 20 mg tablet, Take 20 mg by mouth, Disp: , Rfl:     tadalafil (CIALIS) 20 MG tablet, Take 20 mg by mouth daily as needed for erectile dysfunction, Disp: , Rfl:     triamcinolone (KENALOG) 0 1 % lotion, Apply topically 3 (three) times a day, Disp: , Rfl:     triamcinolone (KENALOG) 0 5 % cream, APPLY 1 APPLICATION TOPICALLY 3 (THREE) TIMES A DAY, Disp: , Rfl:     varenicline (CHANTIX) 1 mg tablet, Take 0 5 mg by mouth 2 (two) times a day, Disp: , Rfl:     zafirlukast (ACCOLATE) 20 MG tablet, Take 20 mg by mouth 2 (two) times a day, Disp: , Rfl:     potassium chloride (Klor-Con) 10 mEq tablet, TAKE ONE TABLET BY MOUTH DAILY AT 9AM (Patient not taking: Reported on 9/1/2022), Disp: , Rfl:     ALLERGIES:  Allergies   Allergen Reactions    Shrimp Extract Allergy Skin Test - Food Allergy Anaphylaxis     And Lobster  Throat closes       ROS:  Review of Systems     Constitutional: Negative for fatigue, fever or loss of appetite  HENT: Negative  Respiratory: Negative for shortness of breath, dyspnea  Cardiovascular: Negative for chest pain/tightness  Gastrointestinal: Negative for abdominal pain, N/V  Endocrine: Negative for cold/heat intolerance, unexplained weight loss/gain  Genitourinary: Negative for flank pain, dysuria, hematuria     Musculoskeletal: Positive for arthralgia   Skin: Negative for rash  Neurological: Negative for numbness or tingling  Psychiatric/Behavioral: Negative for agitation  _____________________________________________________  PHYSICAL EXAMINATION:    Blood pressure 111/73, pulse 73, height 6' 4" (1 93 m), weight 101 kg (222 lb 3 2 oz)  Constitutional: Oriented to person, place, and time  Appears well-developed and well-nourished  No distress  HENT:   Head: Normocephalic  Eyes: Conjunctivae are normal  Right eye exhibits no discharge  Left eye exhibits no discharge  No scleral icterus  Cardiovascular: Normal rate  Pulmonary/Chest: Effort normal    Neurological: Alert and oriented to person, place, and time  Skin: Skin is warm and dry  No rash noted  Not diaphoretic  No erythema  No pallor  Psychiatric: Normal mood and affect  Behavior is normal  Judgment and thought content normal       MUSCULOSKELETAL EXAMINATION:   Physical Exam  Ortho Exam    Bilateral lower extremities are neurovascularly intact  Toes are pink and mobile   Compartments are soft  No warmth, erythema or ecchymosis  ROM of knees are from 5-115 degrees  Negative Lachman, drawer or pivot shift  No medial instability  Medial joint line tenderness, slight lateral joint line tenderness  Patellofemoral crepitation  Objective:  BP Readings from Last 1 Encounters:   09/01/22 111/73      Wt Readings from Last 1 Encounters:   09/01/22 101 kg (222 lb 3 2 oz)        BMI:   Estimated body mass index is 27 05 kg/m² as calculated from the following:    Height as of this encounter: 6' 4" (1 93 m)  Weight as of this encounter: 101 kg (222 lb 3 2 oz)

## 2023-05-22 ENCOUNTER — APPOINTMENT (EMERGENCY)
Dept: CT IMAGING | Facility: HOSPITAL | Age: 73
End: 2023-05-22

## 2023-05-22 ENCOUNTER — HOSPITAL ENCOUNTER (EMERGENCY)
Facility: HOSPITAL | Age: 73
Discharge: HOME | End: 2023-05-22
Attending: EMERGENCY MEDICINE | Admitting: EMERGENCY MEDICINE

## 2023-05-22 ENCOUNTER — APPOINTMENT (EMERGENCY)
Dept: RADIOLOGY | Facility: HOSPITAL | Age: 73
End: 2023-05-22

## 2023-05-22 VITALS
RESPIRATION RATE: 18 BRPM | DIASTOLIC BLOOD PRESSURE: 86 MMHG | HEART RATE: 70 BPM | SYSTOLIC BLOOD PRESSURE: 142 MMHG | OXYGEN SATURATION: 95 % | TEMPERATURE: 97.6 F

## 2023-05-22 DIAGNOSIS — M25.551 RIGHT HIP PAIN: ICD-10-CM
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
## 2023-05-22 DIAGNOSIS — M25.511 RIGHT SHOULDER PAIN: ICD-10-CM

## 2023-05-22 LAB
ABO GROUP BLD: NORMAL
ALBUMIN SERPL BCP-MCNC: 4 G/DL (ref 3.5–5)
ALP SERPL-CCNC: 79 U/L (ref 34–104)
ALT SERPL W P-5'-P-CCNC: 8 U/L (ref 7–52)
ANION GAP SERPL CALCULATED.3IONS-SCNC: 8 MMOL/L (ref 4–13)
AST SERPL W P-5'-P-CCNC: 11 U/L (ref 13–39)
BASOPHILS # BLD AUTO: 0.03 THOUSANDS/ÂΜL (ref 0–0.1)
BASOPHILS NFR BLD AUTO: 0 % (ref 0–1)
BILIRUB SERPL-MCNC: 1.04 MG/DL (ref 0.2–1)
BLD GP AB SCN SERPL QL: NEGATIVE
BUN SERPL-MCNC: 30 MG/DL (ref 5–25)
CALCIUM SERPL-MCNC: 9 MG/DL (ref 8.4–10.2)
CHLORIDE SERPL-SCNC: 101 MMOL/L (ref 96–108)
CO2 SERPL-SCNC: 29 MMOL/L (ref 21–32)
CREAT SERPL-MCNC: 2.33 MG/DL (ref 0.6–1.3)
EOSINOPHIL # BLD AUTO: 0.09 THOUSAND/ÂΜL (ref 0–0.61)
EOSINOPHIL NFR BLD AUTO: 1 % (ref 0–6)
ERYTHROCYTE [DISTWIDTH] IN BLOOD BY AUTOMATED COUNT: 13.4 % (ref 11.6–15.1)
GFR SERPL CREATININE-BSD FRML MDRD: 27 ML/MIN/1.73SQ M
GLUCOSE SERPL-MCNC: 98 MG/DL (ref 65–140)
HCT VFR BLD AUTO: 39.4 % (ref 36.5–49.3)
HGB BLD-MCNC: 13.3 G/DL (ref 12–17)
IMM GRANULOCYTES # BLD AUTO: 0.03 THOUSAND/UL (ref 0–0.2)
IMM GRANULOCYTES NFR BLD AUTO: 0 % (ref 0–2)
LYMPHOCYTES # BLD AUTO: 0.99 THOUSANDS/ÂΜL (ref 0.6–4.47)
LYMPHOCYTES NFR BLD AUTO: 11 % (ref 14–44)
MCH RBC QN AUTO: 28.6 PG (ref 26.8–34.3)
MCHC RBC AUTO-ENTMCNC: 33.8 G/DL (ref 31.4–37.4)
MCV RBC AUTO: 85 FL (ref 82–98)
MONOCYTES # BLD AUTO: 0.53 THOUSAND/ÂΜL (ref 0.17–1.22)
MONOCYTES NFR BLD AUTO: 6 % (ref 4–12)
NEUTROPHILS # BLD AUTO: 7.13 THOUSANDS/ÂΜL (ref 1.85–7.62)
NEUTS SEG NFR BLD AUTO: 82 % (ref 43–75)
NRBC BLD AUTO-RTO: 0 /100 WBCS
PLATELET # BLD AUTO: 234 THOUSANDS/UL (ref 149–390)
PMV BLD AUTO: 9.9 FL (ref 8.9–12.7)
POTASSIUM SERPL-SCNC: 3 MMOL/L (ref 3.5–5.3)
PROT SERPL-MCNC: 6.7 G/DL (ref 6.4–8.4)
RBC # BLD AUTO: 4.65 MILLION/UL (ref 3.88–5.62)
RH BLD: POSITIVE
SODIUM SERPL-SCNC: 138 MMOL/L (ref 135–147)
SPECIMEN EXPIRATION DATE: NORMAL
WBC # BLD AUTO: 8.8 THOUSAND/UL (ref 4.31–10.16)

## 2023-05-22 RX ORDER — POTASSIUM CHLORIDE 20 MEQ/1
40 TABLET, EXTENDED RELEASE ORAL ONCE
Status: DISCONTINUED | OUTPATIENT
Start: 2023-05-22 | End: 2023-05-22 | Stop reason: HOSPADM

## 2023-05-22 NOTE — DISCHARGE INSTRUCTIONS
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

## 2023-05-22 NOTE — ED PROVIDER NOTES
Emergency Department Trauma Note  Alma Rosa Dickey 71 y o  male MRN: [id number]  Unit/Bed#: RM04/RM04 Encounter: [id number]      Trauma Alert: Trauma Acuity: Trauma Evaluation  Model of Arrival: Mode of Arrival: ALS via    Trauma Team: Current Providers  Attending Provider: Tracy Marin MD  Attending Provider: Katey Freitas MD  Registered Nurse: Adelia Ha  Unit Clerk: Ammy Jacob  Consultants:     None      History of Present Illness     Chief Complaint:   Chief Complaint   Patient presents with   • Fall     Patient states that he tripped over a curb while walking to the store  Patient is complaining of right hip pain  Denies head strike  HPI:  Alma Rosa Dickey is a 71 y o  male who presents with fall  At approximately 4 PM, the patient was walking to a store when he tripped over a curb, he does describe this is mechanical fall  He denies head strike  He describes landing on his right side, landing on his right shoulder as well as his right hip  He had significant difficulty getting home due to the pain  He arrives approximately 10 hours later by EMS  He reports significant difficulty moving the right leg due to pain in the right hip  He does describe some mild knee pain as well as well as shoulder pain  He denies any numbness, weakness, tingling  Review of systems otherwise negative  Mechanism:Details of Incident: Patient states he tripped over a curb and fell on his right hip  Injury Date: 05/22/23        HPI  Review of Systems   Constitutional: Negative for chills, diaphoresis, fatigue and fever  HENT: Negative for congestion and sore throat  Eyes: Negative for visual disturbance  Respiratory: Negative for cough, chest tightness and shortness of breath  Cardiovascular: Negative for chest pain, palpitations and leg swelling  Gastrointestinal: Negative for abdominal distention, abdominal pain, constipation, diarrhea, nausea and vomiting     Genitourinary: Negative for difficulty urinating and dysuria  Musculoskeletal: Positive for arthralgias  Negative for myalgias  Skin: Negative for rash  Neurological: Negative for dizziness, weakness, light-headedness, numbness and headaches  Psychiatric/Behavioral: Negative for agitation, behavioral problems and confusion  The patient is not nervous/anxious  All other systems reviewed and are negative  Historical Information     Immunizations: There is no immunization history on file for this patient  Past Medical History:   Diagnosis Date   • Asthma    • COPD (chronic obstructive pulmonary disease) ([facility] )    • Diabetes mellitus ([facility] )    • Hyperlipidemia    • Hypertension      No family history on file  Past Surgical History:   Procedure Laterality Date   • SHOULDER ARTHROPLASTY Right    • TOTAL HIP ARTHROPLASTY Right      Social History     Tobacco Use   • Smoking status: Every Day     Packs/day: 0 25     Types: Cigars, Cigarettes   • Smokeless tobacco: Never   • Tobacco comments:     3 cigars  and pipe   Vaping Use   • Vaping Use: Never used   Substance Use Topics   • Alcohol use: Yes     Comment: occasional weekend use   • Drug use: Never     E-Cigarette/Vaping   • E-Cigarette Use Never User      E-Cigarette/Vaping Substances   • Nicotine No    • THC No    • CBD No    • Flavoring No    • Other No        Family History: non-contributory    Meds/Allergies   Prior to Admission Medications   Prescriptions Last Dose Informant Patient Reported? Taking?    albuterol (PROVENTIL HFA,VENTOLIN HFA) 90 mcg/act inhaler   Yes No   Sig: Inhale 2 puffs 4 (four) times a day   amLODIPine (NORVASC) 10 mg tablet   Yes No   Sig: Take 10 mg by mouth daily   buPROPion (WELLBUTRIN SR) 150 mg 12 hr tablet   Yes No   Sig: Take 150 mg by mouth 2 (two) times a day   cloNIDine (CATAPRES) 0 2 mg tablet   Yes No   Sig: Take 0 2 mg by mouth daily   clobetasol (TEMOVATE) 0 05 % ointment   Yes No   Sig: Apply topically 2 (two) times a day   cyclobenzaprine (FLEXERIL) 10 mg tablet   No No   Sig: Take 1 tablet (10 mg total) by mouth 3 (three) times a day as needed for muscle spasms   diclofenac sodium (VOLTAREN) 1 %   Yes No   Sig: Apply 2 g topically 4 (four) times a day   fluticasone (FLOVENT HFA) 110 MCG/ACT inhaler   Yes No   Sig: Inhale 2 puffs 2 (two) times a day Rinse mouth after use     fluticasone-salmeterol (ADVAIR, WIXELA) 500-50 mcg/dose inhaler   Yes No   Sig: Inhale 1 puff 2 (two) times a day Rinse mouth after use    gabapentin (NEURONTIN) 300 mg capsule   Yes No   Sig: Take 300 mg by mouth 2 (two) times a day   hydrochlorothiazide (HYDRODIURIL) 25 mg tablet   No No   Sig: Take 2 tablets (50 mg total) by mouth daily   ipratropium-albuterol (DUO-NEB) 0 5-2 5 mg/3 mL nebulizer solution   Yes No   Sig: Take 3 mL by nebulization 4 (four) times a day   losartan (COZAAR) 100 MG tablet   Yes No   Sig: Take 100 mg by mouth daily   mometasone (NASONEX) 50 mcg/act nasal spray   Yes No   Si sprays into each nostril daily   omeprazole (PriLOSEC) 20 mg delayed release capsule   Yes No   Sig: Take 20 mg by mouth 2 (two) times a day   potassium chloride (K-DUR,KLOR-CON) 10 mEq tablet   Yes No   Sig: Take 10 mEq by mouth daily   potassium chloride (Klor-Con) 10 mEq tablet   Yes No   Sig: TAKE ONE TABLET BY MOUTH DAILY AT 9AM   Patient not taking: Reported on 2022   predniSONE 20 mg tablet   Yes No   Sig: Take 20 mg by mouth see administration instructions As directed   simvastatin (ZOCOR) 20 mg tablet   Yes No   Sig: Take 20 mg by mouth   tadalafil (CIALIS) 20 MG tablet   Yes No   Sig: Take 20 mg by mouth daily as needed for erectile dysfunction   triamcinolone (KENALOG) 0 1 % lotion   Yes No   Sig: Apply topically 3 (three) times a day   triamcinolone (KENALOG) 0 5 % cream   Yes No   Sig: APPLY 1 APPLICATION TOPICALLY 3 (THREE) TIMES A DAY   varenicline (CHANTIX) 1 mg tablet   Yes No   Sig: Take 0 5 mg by mouth 2 (two) times a day   zafirlukast (ACCOLATE) 20 MG tablet   Yes No   Sig: Take 20 mg by mouth 2 (two) times a day      Facility-Administered Medications: None       Allergies   Allergen Reactions   • Shrimp Extract Allergy Skin Test - Food Allergy Anaphylaxis     And Lobster  Throat closes       PHYSICAL EXAM      Objective   Vitals:   First set: Temperature: (!) 97 1 °F (36 2 °C) (05/22/23 0235)  Pulse: 82 (05/22/23 0235)  Respirations: (!) 118 (05/22/23 0235)  Blood Pressure: 129/79 (05/22/23 0235)  SpO2: 97 % (05/22/23 0231)    Primary Survey:   (A) Airway: Intact  (B) Breathing: Equal BL  (C) Circulation: Pulses:   normal  (D) Disabliity:  GCS Total:  15  (E) Expose:  Completed    Secondary Survey: (Click on Physical Exam tab above)  Physical Exam  Constitutional:       Appearance: He is well-developed  HENT:      Head: Normocephalic and atraumatic  Cardiovascular:      Rate and Rhythm: Normal rate and regular rhythm  Heart sounds: Normal heart sounds  No murmur heard  Pulmonary:      Effort: Pulmonary effort is normal  No respiratory distress  Breath sounds: Normal breath sounds  Abdominal:      General: Bowel sounds are normal  There is no distension  Palpations: Abdomen is soft  Tenderness: There is no abdominal tenderness  Musculoskeletal:         General: Tenderness present  No deformity  Comments: No midline tenderness of the C-, T-, or L-spine    No tenderness of the thorax    No instability or pain when pressing on pelvis    RUE:  No tenderness, no pain ranging joints of the shoulder, elbow, wrist, hand    LUE:  No tenderness, no pain ranging joints of the shoulder, elbow, wrist, hand    RLE:   + tenderness of the medial knee, lateral hip  Patient is not able to flex or extend the hip/knee due to significant R hip pain  Patient moving feet without difficulty    LLE:   No tenderness, no pain ranging the hip, knee, ankle   Skin:     General: Skin is warm  Findings: No rash     Neurological:      Mental Status: He is alert and oriented to person, place, and time  Psychiatric:         Behavior: Behavior normal          Thought Content: Thought content normal          Judgment: Judgment normal          Cervical spine cleared by clinical criteria?  Yes     Invasive Devices     Peripheral Intravenous Line  Duration           Peripheral IV 05/22/23 Upper;Right Arm <1 day                Lab Results:   Results Reviewed     Procedure Component Value Units Date/Time    Comprehensive metabolic panel [[id number]]  (Abnormal) Collected: 05/22/23 0320    Lab Status: Final result Specimen: Blood from Arm, Right Updated: 05/22/23 0346     Sodium 138 mmol/L      Potassium 3 0 mmol/L      Chloride 101 mmol/L      CO2 29 mmol/L      ANION GAP 8 mmol/L      BUN 30 mg/dL      Creatinine 2 33 mg/dL      Glucose 98 mg/dL      Calcium 9 0 mg/dL      AST 11 U/L      ALT 8 U/L      Alkaline Phosphatase 79 U/L      Total Protein 6 7 g/dL      Albumin 4 0 g/dL      Total Bilirubin 1 04 mg/dL      eGFR 27 ml/min/1 73sq m     Narrative:      Meagan guidelines for Chronic Kidney Disease (CKD):   •  Stage 1 with normal or high GFR (GFR > 90 mL/min/1 73 square meters)  •  Stage 2 Mild CKD (GFR = 60-89 mL/min/1 73 square meters)  •  Stage 3A Moderate CKD (GFR = 45-59 mL/min/1 73 square meters)  •  Stage 3B Moderate CKD (GFR = 30-44 mL/min/1 73 square meters)  •  Stage 4 Severe CKD (GFR = 15-29 mL/min/1 73 square meters)  •  Stage 5 End Stage CKD (GFR <15 mL/min/1 73 square meters)  Note: GFR calculation is accurate only with a steady state creatinine    CBC and differential [[id number]]  (Abnormal) Collected: 05/22/23 0320    Lab Status: Final result Specimen: Blood from Arm, Right Updated: 05/22/23 0328     WBC 8 80 Thousand/uL      RBC 4 65 Million/uL      Hemoglobin 13 3 g/dL      Hematocrit 39 4 %      MCV 85 fL      MCH 28 6 pg      MCHC 33 8 g/dL      RDW 13 4 %      MPV 9 9 fL      Platelets 290 Thousands/uL      nRBC 0 /100 WBCs      Neutrophils Relative 82 %      Immat GRANS % 0 %      Lymphocytes Relative 11 %      Monocytes Relative 6 %      Eosinophils Relative 1 %      Basophils Relative 0 %      Neutrophils Absolute 7 13 Thousands/µL      Immature Grans Absolute 0 03 Thousand/uL      Lymphocytes Absolute 0 99 Thousands/µL      Monocytes Absolute 0 53 Thousand/µL      Eosinophils Absolute 0 09 Thousand/µL      Basophils Absolute 0 03 Thousands/µL                  Imaging Studies:   Direct to CT: Yes  XR hip/pelv 2-3 vws right if performed   ED Interpretation by Bandar Olivia MD (05/22 9891)   No acute osseous abnormality  Final Result by Nahomi Díaz MD (05/22 4736)      Unremarkable appearance of right total hip arthroplasty  There are changes suggesting AVN in the left femoral head  No acute osseous abnormalities  Workstation performed: [id number]         XR femur 2 views RIGHT   ED Interpretation by Bandar Olivia MD (05/22 3228)   No acute osseous abnormality  Final Result by Nahomi Díaz MD (05/22 1190)      Unremarkable appearance of right total hip arthroplasty  There are changes suggesting AVN in the left femoral head  No acute osseous abnormalities  Workstation performed: [id number]         XR shoulder 2+ views RIGHT   ED Interpretation by Bandar Olivia MD (05/22 6801)   No acute osseous abnormality  Final Result by Nahomi Díaz MD (05/22 0440)      Unremarkable appearance of total shoulder arthroplasty  Workstation performed: [id number]         XR knee 4+ vw right injury   ED Interpretation by Bandar Olivia MD (05/22 5450)   No acute osseous abnormality  Final Result by Nahomi Díaz MD (05/22 3831)      No acute osseous abnormality  Degenerative changes as described              Workstation performed: [id number]         CT chest abdomen pelvis wo contrast   Final Result by Art Delgado MD (05/22 9984)      1  Markedly enlarged heterogeneous right thyroid lobe mass extending inferiorly into the mediastinum compressing the trachea with left-sided tracheal deviation  Consider nonemergent thyroid ultrasound for better evaluation  2   Upper lobe predominant moderate paraseptal and centrilobular emphysema  3   Nonspecific 5 mm medial left lower lobe lung nodule is seen  No follow-up imaging recommended per current Fleischner Society guidelines for low risk patients  4   Mild scattered calcific atherosclerosis  5   Mild colonic constipation without evidence for bowel obstruction, mesenteric inflammation, appendicitis, obstructive uropathy, free air, or free fluid  Mild diffuse wall thickening of the stomach could be due to incomplete distention or gastritis  Workstation performed: [id number]         TRAUMA - CT head wo contrast   Final Result by Huber Gardiner MD (05/22 0608)      No acute intracranial hemorrhage or depressed calvarial fracture  Workstation performed: [id number]         TRAUMA - CT spine cervical wo contrast   Final Result by Huber Gardiner MD (05/22 9749)      Severe multilevel degenerative changes without acute cervical spine fracture or traumatic malalignment  Markedly enlarged right thyroid goiter/mass with heterogeneous attenuation extending inferiorly into the mediastinum  There is associated left-sided    tracheal deviation and mild compression  Consider nonemergent thyroid ultrasound for better evaluation  Workstation performed: [id number]         XR Trauma chest portable   Final Result by Yanely Driver MD (05/22 4795)   No acute cardiopulmonary findings  Findings of goiter  Please see subsequent CT  Workstation performed: [id number]         XR Trauma pelvis ap only 1 or 2 vw   Final Result by Yanely Driver MD (05/22 7085)   No acute osseous abnormality  Nonemergent findings above        IMPRESSION:      No acute osseous abnormality  Workstation performed: [id number]               Procedures  Procedures         ED Course  ED Course as of 05/22/23 2208   Mon May 22, 2023   0817 Patient tolerated ambulating in the room  States he feels comfortable going home  Describes he has felt weak in the lower extremities recently, which is why he tripped today  He states this has been ongoing and he has been using a cane at times  He wants to follow up with PCP regarding this, does not want admission  I provided the patient strict return precautions as well as informed of incidental findings (thyroid nodule and lung nodule)           Medical Decision Making  I reviewed the patient's medical chart, PMHx, prior encounters, medications  My independent interpretation of CXR demonstrated: No acute cardiopulmonary disease  My independent interpretation of shoulder XR demonstrated: No acute osseous abnormality  My independent interpretation of right hip XR demonstrated: No acute osseous abnormality  My independent interpretation of right femur XR demonstrated: No acute osseous abnormality  My independent interpretation of pelvis XR demonstrated: No acute osseous abnormality  My DDx includes: hip fracture, hip dislocation, pelvic fracture, shoulder fracture, head injury    CT pan-scan ordered, was unremarkable for acute pathology  Patient did have low potassium which was supplemented  See ED course, patient did not want admission, felt comfortable going home with walker (has frequently been using cane at home)  Will discharge with strict return precautions, PCP follow up for incidental findings  Amount and/or Complexity of Data Reviewed  Labs: ordered  Radiology: ordered and independent interpretation performed                    Disposition  [facility] Transfer: No  Final diagnoses:   Fall, initial encounter   Right shoulder pain   Right hip pain     Time reflects when diagnosis was documented in both MDM as applicable and the Disposition within this note     Time User Action Codes Description Comment    5/22/2023  6:41 AM Eric Helton Add [D58  QECH] Fall, initial encounter     5/22/2023  6:41 AM Eric Helton Add [M25 511] Right shoulder pain     5/22/2023  6:41 AM Eric Helton Add [M25 551] Right hip pain       ED Disposition     ED Disposition   Discharge    Condition   Stable    Date/Time   Mon May 22, 2023  6:41 AM    Comment   Candice Cole discharge to home/self care                 Follow-up Information     Follow up With Specialties Details Why Contact Info    Abi Mederos MD Internal Medicine Call  For re-evaluation [address] [place] [address]  [phone]          Discharge Medication List as of 5/22/2023  6:43 AM      CONTINUE these medications which have NOT CHANGED    Details   albuterol (PROVENTIL HFA,VENTOLIN HFA) 90 mcg/act inhaler Inhale 2 puffs 4 (four) times a day, Historical Med      amLODIPine (NORVASC) 10 mg tablet Take 10 mg by mouth daily, Starting Wed 8/21/2019, Historical Med      buPROPion (WELLBUTRIN SR) 150 mg 12 hr tablet Take 150 mg by mouth 2 (two) times a day, Historical Med      clobetasol (TEMOVATE) 0 05 % ointment Apply topically 2 (two) times a day, Historical Med      cloNIDine (CATAPRES) 0 2 mg tablet Take 0 2 mg by mouth daily, Starting Wed 8/21/2019, Historical Med      cyclobenzaprine (FLEXERIL) 10 mg tablet Take 1 tablet (10 mg total) by mouth 3 (three) times a day as needed for muscle spasms, Starting Fri 5/7/2021, Normal      diclofenac sodium (VOLTAREN) 1 % Apply 2 g topically 4 (four) times a day, Historical Med      fluticasone (FLOVENT HFA) 110 MCG/ACT inhaler Inhale 2 puffs 2 (two) times a day Rinse mouth after use , Historical Med      fluticasone-salmeterol (ADVAIR, WIXELA) 500-50 mcg/dose inhaler Inhale 1 puff 2 (two) times a day Rinse mouth after use , Historical Med      gabapentin (NEURONTIN) 300 mg capsule Take 300 mg by mouth 2 (two) times a day, Historical Med      hydrochlorothiazide (HYDRODIURIL) 25 mg tablet Take 2 tablets (50 mg total) by mouth daily, Starting Sun 1/19/2020, Normal      ipratropium-albuterol (DUO-NEB) 0 5-2 5 mg/3 mL nebulizer solution Take 3 mL by nebulization 4 (four) times a day, Historical Med      losartan (COZAAR) 100 MG tablet Take 100 mg by mouth daily, Starting Wed 8/21/2019, Historical Med      mometasone (NASONEX) 50 mcg/act nasal spray 2 sprays into each nostril daily, Historical Med      omeprazole (PriLOSEC) 20 mg delayed release capsule Take 20 mg by mouth 2 (two) times a day, Historical Med      potassium chloride (K-DUR,KLOR-CON) 10 mEq tablet Take 10 mEq by mouth daily, Historical Med      potassium chloride (Klor-Con) 10 mEq tablet TAKE ONE TABLET BY MOUTH DAILY AT 9AM, Historical Med      predniSONE 20 mg tablet Take 20 mg by mouth see administration instructions As directed, Historical Med      simvastatin (ZOCOR) 20 mg tablet Take 20 mg by mouth, Starting Wed 8/21/2019, Historical Med      tadalafil (CIALIS) 20 MG tablet Take 20 mg by mouth daily as needed for erectile dysfunction, Historical Med      triamcinolone (KENALOG) 0 1 % lotion Apply topically 3 (three) times a day, Historical Med      triamcinolone (KENALOG) 0 5 % cream APPLY 1 APPLICATION TOPICALLY 3 (THREE) TIMES A DAY, Historical Med      varenicline (CHANTIX) 1 mg tablet Take 0 5 mg by mouth 2 (two) times a day, Starting Wed 11/28/2018, Historical Med      zafirlukast (ACCOLATE) 20 MG tablet Take 20 mg by mouth 2 (two) times a day, Historical Med           No discharge procedures on file      PDMP Review     None          ED Provider  Electronically Signed by         Braxton Lara MD  05/22/23 0682

## 2023-09-18 ENCOUNTER — OFFICE VISIT (OUTPATIENT)
Dept: URGENT CARE | Facility: CLINIC | Age: 73
End: 2023-09-18
Payer: COMMERCIAL

## 2023-09-18 ENCOUNTER — APPOINTMENT (EMERGENCY)
Dept: CT IMAGING | Facility: HOSPITAL | Age: 73
End: 2023-09-18
Payer: COMMERCIAL

## 2023-09-18 ENCOUNTER — HOSPITAL ENCOUNTER (EMERGENCY)
Facility: HOSPITAL | Age: 73
Discharge: HOME | End: 2023-09-18
Attending: EMERGENCY MEDICINE | Admitting: EMERGENCY MEDICINE
Payer: COMMERCIAL

## 2023-09-18 VITALS
OXYGEN SATURATION: 99 % | HEART RATE: 78 BPM | TEMPERATURE: 98.2 F | SYSTOLIC BLOOD PRESSURE: 168 MMHG | RESPIRATION RATE: 18 BRPM | DIASTOLIC BLOOD PRESSURE: 103 MMHG

## 2023-09-18 VITALS
OXYGEN SATURATION: 98 % | HEART RATE: 72 BPM | RESPIRATION RATE: 18 BRPM | DIASTOLIC BLOOD PRESSURE: 97 MMHG | TEMPERATURE: 96.9 F | SYSTOLIC BLOOD PRESSURE: 198 MMHG

## 2023-09-18 DIAGNOSIS — R10.2 PELVIC PAIN: Primary | ICD-10-CM

## 2023-09-18 DIAGNOSIS — E87.6 HYPOKALEMIA: ICD-10-CM

## 2023-09-18 DIAGNOSIS — Z87.448 HISTORY OF KIDNEY DISEASE: ICD-10-CM

## 2023-09-18 DIAGNOSIS — I10 CHRONIC HYPERTENSION: ICD-10-CM

## 2023-09-18 DIAGNOSIS — N18.9 CKD (CHRONIC KIDNEY DISEASE): ICD-10-CM

## 2023-09-18 DIAGNOSIS — K31.89 GASTRIC WALL THICKENING: ICD-10-CM

## 2023-09-18 DIAGNOSIS — R10.9 ABDOMINAL PAIN: Primary | ICD-10-CM

## 2023-09-18 DIAGNOSIS — R39.89 ABNORMAL URINE COLOR: ICD-10-CM

## 2023-09-18 LAB
2HR DELTA HS TROPONIN: 1 NG/L
ALBUMIN SERPL BCP-MCNC: 3.9 G/DL (ref 3.5–5)
ALP SERPL-CCNC: 70 U/L (ref 34–104)
ALT SERPL W P-5'-P-CCNC: 6 U/L (ref 7–52)
ANION GAP SERPL CALCULATED.3IONS-SCNC: 8 MMOL/L
APTT PPP: 31 SECONDS (ref 23–37)
AST SERPL W P-5'-P-CCNC: 11 U/L (ref 13–39)
BASOPHILS # BLD AUTO: 0.04 THOUSANDS/ÂΜL (ref 0–0.1)
BASOPHILS NFR BLD AUTO: 1 % (ref 0–1)
BILIRUB SERPL-MCNC: 1.11 MG/DL (ref 0.2–1)
BILIRUB UR QL STRIP: NEGATIVE
BUN SERPL-MCNC: 16 MG/DL (ref 5–25)
CALCIUM SERPL-MCNC: 8.7 MG/DL (ref 8.4–10.2)
CARDIAC TROPONIN I PNL SERPL HS: 8 NG/L
CARDIAC TROPONIN I PNL SERPL HS: 9 NG/L
CHLORIDE SERPL-SCNC: 107 MMOL/L (ref 96–108)
CK SERPL-CCNC: 128 U/L (ref 39–308)
CLARITY UR: CLEAR
CO2 SERPL-SCNC: 27 MMOL/L (ref 21–32)
COLOR UR: YELLOW
CREAT SERPL-MCNC: 1.62 MG/DL (ref 0.6–1.3)
EOSINOPHIL # BLD AUTO: 0.09 THOUSAND/ÂΜL (ref 0–0.61)
EOSINOPHIL NFR BLD AUTO: 2 % (ref 0–6)
ERYTHROCYTE [DISTWIDTH] IN BLOOD BY AUTOMATED COUNT: 13.7 % (ref 11.6–15.1)
GFR SERPL CREATININE-BSD FRML MDRD: 41 ML/MIN/1.73SQ M
GLUCOSE SERPL-MCNC: 79 MG/DL (ref 65–140)
GLUCOSE UR STRIP-MCNC: NEGATIVE MG/DL
HCT VFR BLD AUTO: 39.1 % (ref 36.5–49.3)
HGB BLD-MCNC: 12.7 G/DL (ref 12–17)
HGB UR QL STRIP.AUTO: NEGATIVE
IMM GRANULOCYTES # BLD AUTO: 0.01 THOUSAND/UL (ref 0–0.2)
IMM GRANULOCYTES NFR BLD AUTO: 0 % (ref 0–2)
INR PPP: 1.25 (ref 0.84–1.19)
KETONES UR STRIP-MCNC: NEGATIVE MG/DL
LACTATE SERPL-SCNC: 1 MMOL/L (ref 0.5–2)
LEUKOCYTE ESTERASE UR QL STRIP: NEGATIVE
LIPASE SERPL-CCNC: 56 U/L (ref 11–82)
LYMPHOCYTES # BLD AUTO: 1.41 THOUSANDS/ÂΜL (ref 0.6–4.47)
LYMPHOCYTES NFR BLD AUTO: 30 % (ref 14–44)
MCH RBC QN AUTO: 28.1 PG (ref 26.8–34.3)
MCHC RBC AUTO-ENTMCNC: 32.5 G/DL (ref 31.4–37.4)
MCV RBC AUTO: 87 FL (ref 82–98)
MONOCYTES # BLD AUTO: 0.5 THOUSAND/ÂΜL (ref 0.17–1.22)
MONOCYTES NFR BLD AUTO: 11 % (ref 4–12)
NEUTROPHILS # BLD AUTO: 2.73 THOUSANDS/ÂΜL (ref 1.85–7.62)
NEUTS SEG NFR BLD AUTO: 56 % (ref 43–75)
NITRITE UR QL STRIP: NEGATIVE
NRBC BLD AUTO-RTO: 0 /100 WBCS
PH UR STRIP.AUTO: 7 [PH]
PLATELET # BLD AUTO: 201 THOUSANDS/UL (ref 149–390)
PMV BLD AUTO: 10.2 FL (ref 8.9–12.7)
POTASSIUM SERPL-SCNC: 3.3 MMOL/L (ref 3.5–5.3)
PROT SERPL-MCNC: 6.1 G/DL (ref 6.4–8.4)
PROT UR STRIP-MCNC: NEGATIVE MG/DL
PROTHROMBIN TIME: 15.5 SECONDS (ref 11.6–14.5)
RBC # BLD AUTO: 4.52 MILLION/UL (ref 3.88–5.62)
SODIUM SERPL-SCNC: 142 MMOL/L (ref 135–147)
SP GR UR STRIP.AUTO: 1.01 (ref 1–1.03)
UROBILINOGEN UR QL STRIP.AUTO: 0.2 E.U./DL
WBC # BLD AUTO: 4.78 THOUSAND/UL (ref 4.31–10.16)

## 2023-09-18 PROCEDURE — 85730 THROMBOPLASTIN TIME PARTIAL: CPT | Performed by: PHYSICIAN ASSISTANT

## 2023-09-18 PROCEDURE — 83605 ASSAY OF LACTIC ACID: CPT | Performed by: PHYSICIAN ASSISTANT

## 2023-09-18 PROCEDURE — 83690 ASSAY OF LIPASE: CPT | Performed by: PHYSICIAN ASSISTANT

## 2023-09-18 PROCEDURE — 93005 ELECTROCARDIOGRAM TRACING: CPT

## 2023-09-18 PROCEDURE — 80053 COMPREHEN METABOLIC PANEL: CPT | Performed by: PHYSICIAN ASSISTANT

## 2023-09-18 PROCEDURE — G0463 HOSPITAL OUTPT CLINIC VISIT: HCPCS | Performed by: NURSE PRACTITIONER

## 2023-09-18 PROCEDURE — 81003 URINALYSIS AUTO W/O SCOPE: CPT | Performed by: PHYSICIAN ASSISTANT

## 2023-09-18 PROCEDURE — 96375 TX/PRO/DX INJ NEW DRUG ADDON: CPT

## 2023-09-18 PROCEDURE — 36415 COLL VENOUS BLD VENIPUNCTURE: CPT | Performed by: PHYSICIAN ASSISTANT

## 2023-09-18 PROCEDURE — 96365 THER/PROPH/DIAG IV INF INIT: CPT

## 2023-09-18 PROCEDURE — 82550 ASSAY OF CK (CPK): CPT | Performed by: PHYSICIAN ASSISTANT

## 2023-09-18 PROCEDURE — 99213 OFFICE O/P EST LOW 20 MIN: CPT | Performed by: NURSE PRACTITIONER

## 2023-09-18 PROCEDURE — 96366 THER/PROPH/DIAG IV INF ADDON: CPT

## 2023-09-18 PROCEDURE — 85025 COMPLETE CBC W/AUTO DIFF WBC: CPT | Performed by: PHYSICIAN ASSISTANT

## 2023-09-18 PROCEDURE — G1004 CDSM NDSC: HCPCS

## 2023-09-18 PROCEDURE — 74176 CT ABD & PELVIS W/O CONTRAST: CPT

## 2023-09-18 PROCEDURE — 87086 URINE CULTURE/COLONY COUNT: CPT | Performed by: PHYSICIAN ASSISTANT

## 2023-09-18 PROCEDURE — 85610 PROTHROMBIN TIME: CPT | Performed by: PHYSICIAN ASSISTANT

## 2023-09-18 PROCEDURE — 99285 EMERGENCY DEPT VISIT HI MDM: CPT | Performed by: PHYSICIAN ASSISTANT

## 2023-09-18 PROCEDURE — 84484 ASSAY OF TROPONIN QUANT: CPT | Performed by: PHYSICIAN ASSISTANT

## 2023-09-18 PROCEDURE — 99284 EMERGENCY DEPT VISIT MOD MDM: CPT

## 2023-09-18 RX ORDER — SODIUM CHLORIDE, SODIUM GLUCONATE, SODIUM ACETATE, POTASSIUM CHLORIDE, MAGNESIUM CHLORIDE, SODIUM PHOSPHATE, DIBASIC, AND POTASSIUM PHOSPHATE .53; .5; .37; .037; .03; .012; .00082 G/100ML; G/100ML; G/100ML; G/100ML; G/100ML; G/100ML; G/100ML
1000 INJECTION, SOLUTION INTRAVENOUS ONCE
Status: COMPLETED | OUTPATIENT
Start: 2023-09-18 | End: 2023-09-18

## 2023-09-18 RX ORDER — POTASSIUM CHLORIDE 20 MEQ/1
40 TABLET, EXTENDED RELEASE ORAL ONCE
Status: COMPLETED | OUTPATIENT
Start: 2023-09-18 | End: 2023-09-18

## 2023-09-18 RX ORDER — FENTANYL CITRATE 50 UG/ML
50 INJECTION, SOLUTION INTRAMUSCULAR; INTRAVENOUS ONCE
Status: COMPLETED | OUTPATIENT
Start: 2023-09-18 | End: 2023-09-18

## 2023-09-18 RX ADMIN — FENTANYL CITRATE 50 MCG: 50 INJECTION, SOLUTION INTRAMUSCULAR; INTRAVENOUS at 17:50

## 2023-09-18 RX ADMIN — SODIUM CHLORIDE, SODIUM GLUCONATE, SODIUM ACETATE, POTASSIUM CHLORIDE, MAGNESIUM CHLORIDE, SODIUM PHOSPHATE, DIBASIC, AND POTASSIUM PHOSPHATE 1000 ML: .53; .5; .37; .037; .03; .012; .00082 INJECTION, SOLUTION INTRAVENOUS at 18:32

## 2023-09-18 RX ADMIN — POTASSIUM CHLORIDE 40 MEQ: 1500 TABLET, EXTENDED RELEASE ORAL at 20:07

## 2023-09-18 NOTE — ED PROVIDER NOTES
History  Chief Complaint   Patient presents with   • Abdominal Pain     Patient reports lower abdominal pain that started around Tuesday/Wednesday of last week. Was seen at urgent care and sent to the ED for further evaluation      68year old male with PMH COPD, DM, HTN, HLD, CKD presents ambulatory from urgent care for further evaluation. Pt reports he has been having abdominal pain since last week. He reports pain in lower abdomen which radiates into his groin. He reports pain has been severe. He also notes his urine has been colored like chocolate syrup but no reports blood or clots. He reports having frequency and urgency of urination and reports voiding small amounts. No reported dysuria. He notes h/o UTI in the past.  Has tried cranberry juice without relief. Denies fever, chills. Denies cough, congestion or recent illness. Denies chest pain, SOB. Denies N/V/D/C. No reported falls/injury or trauma. He notes recent endoscopies (EGD and colonoscopy) last month. He denies any catheter use. No reported aggravating or alleviating factors. He was at urgent care and subsequently referred to ER for further evaluation and management of his symptoms. History provided by:  Patient and medical records   used: No    Abdominal Pain  Pain location:  Suprapubic  Pain quality: sharp    Pain radiates to:  Groin  Progression:  Unchanged  Chronicity:  New  Context: not recent illness, not recent travel, not retching, not sick contacts, not suspicious food intake and not trauma    Relieved by:  Nothing  Worsened by:  Nothing  Ineffective treatments:  None tried  Associated symptoms: no chest pain, no chills, no constipation, no cough, no diarrhea, no dysuria, no fatigue, no fever, no nausea, no shortness of breath, no sore throat and no vomiting    Risk factors: no recent hospitalization        Prior to Admission Medications   Prescriptions Last Dose Informant Patient Reported? Taking? albuterol (PROVENTIL HFA,VENTOLIN HFA) 90 mcg/act inhaler   Yes No   Sig: Inhale 2 puffs 4 (four) times a day   amLODIPine (NORVASC) 10 mg tablet   Yes No   Sig: Take 10 mg by mouth daily   buPROPion (WELLBUTRIN SR) 150 mg 12 hr tablet   Yes No   Sig: Take 150 mg by mouth 2 (two) times a day   cloNIDine (CATAPRES) 0.2 mg tablet   Yes No   Sig: Take 0.2 mg by mouth daily   clobetasol (TEMOVATE) 0.05 % ointment   Yes No   Sig: Apply topically 2 (two) times a day   cyclobenzaprine (FLEXERIL) 10 mg tablet   No No   Sig: Take 1 tablet (10 mg total) by mouth 3 (three) times a day as needed for muscle spasms   diclofenac sodium (VOLTAREN) 1 %   Yes No   Sig: Apply 2 g topically 4 (four) times a day   fluticasone (FLOVENT HFA) 110 MCG/ACT inhaler   Yes No   Sig: Inhale 2 puffs 2 (two) times a day Rinse mouth after use.    fluticasone-salmeterol (ADVAIR, WIXELA) 500-50 mcg/dose inhaler   Yes No   Sig: Inhale 1 puff 2 (two) times a day Rinse mouth after use.   gabapentin (NEURONTIN) 300 mg capsule   Yes No   Sig: Take 300 mg by mouth 2 (two) times a day   hydrochlorothiazide (HYDRODIURIL) 25 mg tablet   No No   Sig: Take 2 tablets (50 mg total) by mouth daily   ipratropium-albuterol (DUO-NEB) 0.5-2.5 mg/3 mL nebulizer solution   Yes No   Sig: Take 3 mL by nebulization 4 (four) times a day   losartan (COZAAR) 100 MG tablet   Yes No   Sig: Take 100 mg by mouth daily   mometasone (NASONEX) 50 mcg/act nasal spray   Yes No   Si sprays into each nostril daily   omeprazole (PriLOSEC) 20 mg delayed release capsule   Yes No   Sig: Take 20 mg by mouth 2 (two) times a day   potassium chloride (K-DUR,KLOR-CON) 10 mEq tablet   Yes No   Sig: Take 10 mEq by mouth daily   potassium chloride (Klor-Con) 10 mEq tablet   Yes No   Sig: TAKE ONE TABLET BY MOUTH DAILY AT 9AM   Patient not taking: Reported on 2022   predniSONE 20 mg tablet   Yes No   Sig: Take 20 mg by mouth see administration instructions As directed   simvastatin (ZOCOR) 20 mg tablet   Yes No   Sig: Take 20 mg by mouth   tadalafil (CIALIS) 20 MG tablet   Yes No   Sig: Take 20 mg by mouth daily as needed for erectile dysfunction   triamcinolone (KENALOG) 0.1 % lotion   Yes No   Sig: Apply topically 3 (three) times a day   triamcinolone (KENALOG) 0.5 % cream   Yes No   Sig: APPLY 1 APPLICATION TOPICALLY 3 (THREE) TIMES A DAY   varenicline (CHANTIX) 1 mg tablet   Yes No   Sig: Take 0.5 mg by mouth 2 (two) times a day   zafirlukast (ACCOLATE) 20 MG tablet   Yes No   Sig: Take 20 mg by mouth 2 (two) times a day      Facility-Administered Medications: None       Past Medical History:   Diagnosis Date   • Asthma    • COPD (chronic obstructive pulmonary disease) (HCC)    • Diabetes mellitus (HCC)    • Hyperlipidemia    • Hypertension        Past Surgical History:   Procedure Laterality Date   • SHOULDER ARTHROPLASTY Right    • TOTAL HIP ARTHROPLASTY Right        History reviewed. No pertinent family history. I have reviewed and agree with the history as documented. E-Cigarette/Vaping   • E-Cigarette Use Never User      E-Cigarette/Vaping Substances   • Nicotine No    • THC No    • CBD No    • Flavoring No    • Other No      Social History     Tobacco Use   • Smoking status: Every Day     Packs/day: 0.25     Types: Cigars, Cigarettes   • Smokeless tobacco: Never   • Tobacco comments:     3 cigars  and pipe   Vaping Use   • Vaping Use: Never used   Substance Use Topics   • Alcohol use: Yes     Comment: occasional weekend use   • Drug use: Never       Review of Systems   Constitutional: Negative. Negative for chills, fatigue and fever. HENT: Negative. Negative for congestion, rhinorrhea and sore throat. Eyes: Negative. Negative for visual disturbance. Respiratory: Negative. Negative for cough, shortness of breath and wheezing. Cardiovascular: Negative. Negative for chest pain, palpitations and leg swelling. Gastrointestinal: Positive for abdominal pain.  Negative for constipation, diarrhea, nausea and vomiting. Genitourinary: Positive for decreased urine volume, difficulty urinating, frequency and urgency. Negative for dysuria and flank pain. Musculoskeletal: Negative. Negative for back pain, myalgias and neck pain. Skin: Negative. Negative for rash. Neurological: Negative. Negative for dizziness, light-headedness, numbness and headaches. Psychiatric/Behavioral: Negative. All other systems reviewed and are negative. Physical Exam  Physical Exam  Vitals and nursing note reviewed. Constitutional:       General: He is awake. He is not in acute distress. Appearance: Normal appearance. He is well-developed. He is not toxic-appearing or diaphoretic. HENT:      Head: Normocephalic and atraumatic. Right Ear: Hearing and external ear normal.      Left Ear: Hearing and external ear normal.      Mouth/Throat:      Mouth: Mucous membranes are moist.      Pharynx: Oropharynx is clear. Uvula midline. Eyes:      General: Lids are normal. No scleral icterus. Conjunctiva/sclera: Conjunctivae normal.   Neck:      Trachea: Trachea and phonation normal.   Cardiovascular:      Rate and Rhythm: Normal rate and regular rhythm. Pulses: Normal pulses. Radial pulses are 2+ on the right side and 2+ on the left side. Dorsalis pedis pulses are 2+ on the right side and 2+ on the left side. Posterior tibial pulses are 2+ on the right side and 2+ on the left side. Heart sounds: Normal heart sounds, S1 normal and S2 normal.   Pulmonary:      Effort: Pulmonary effort is normal. No tachypnea or respiratory distress. Breath sounds: Normal breath sounds. No wheezing, rhonchi or rales. Abdominal:      General: Bowel sounds are normal. There is no distension. Palpations: Abdomen is soft. Tenderness: There is abdominal tenderness in the suprapubic area. There is no right CVA tenderness, left CVA tenderness, guarding or rebound. Musculoskeletal:         General: No tenderness. Normal range of motion. Cervical back: Normal range of motion and neck supple. Right lower leg: No edema. Left lower leg: No edema. Skin:     General: Skin is warm and dry. Capillary Refill: Capillary refill takes less than 2 seconds. Findings: No rash. Neurological:      General: No focal deficit present. Mental Status: He is alert and oriented to person, place, and time. GCS: GCS eye subscore is 4. GCS verbal subscore is 5. GCS motor subscore is 6. Gait: Gait normal.      Comments: Pt ambulatory to exam room, steady gait. Psychiatric:         Mood and Affect: Mood normal.         Speech: Speech normal.         Behavior: Behavior normal. Behavior is cooperative.          Vital Signs  ED Triage Vitals   Temperature Pulse Respirations Blood Pressure SpO2   09/18/23 1645 09/18/23 1645 09/18/23 1645 09/18/23 1645 09/18/23 1645   98.5 °F (36.9 °C) 71 18 (!) 191/102 99 %      Temp Source Heart Rate Source Patient Position - Orthostatic VS BP Location FiO2 (%)   09/18/23 1645 09/18/23 1645 09/18/23 1913 09/18/23 1913 --   Temporal Monitor Lying Left arm       Pain Score       09/18/23 1750       3           Vitals:    09/18/23 1700 09/18/23 1913 09/18/23 1941 09/18/23 2000   BP: 169/97  (!) 200/101 (!) 198/97   Pulse: 71 62  72   Patient Position - Orthostatic VS:  Lying           Visual Acuity      ED Medications  Medications   fentanyl citrate (PF) 100 MCG/2ML 50 mcg (50 mcg Intravenous Given 9/18/23 1750)   multi-electrolyte (ISOLYTE-S PH 7.4) bolus 1,000 mL (0 mL Intravenous Stopped 9/18/23 2010)   potassium chloride (K-DUR,KLOR-CON) CR tablet 40 mEq (40 mEq Oral Given 9/18/23 2007)       Diagnostic Studies  Results Reviewed     Procedure Component Value Units Date/Time    Urine culture [[id number]]     Lab Status: No result Specimen: Urine, Clean Catch     HS Troponin I 2hr [[id number]]  (Normal) Collected: 09/18/23 1947 Lab Status: Final result Specimen: Blood from Arm, Left Updated: 09/18/23 2022     hs TnI 2hr 9 ng/L      Delta 2hr hsTnI 1 ng/L     UA w Reflex to Microscopic w Reflex to Culture [[id number]] Collected: 09/18/23 2009    Lab Status: Final result Specimen: Urine, Clean Catch Updated: 09/18/23 2020     Color, UA Yellow     Clarity, UA Clear     Specific Gravity, UA 1.015     pH, UA 7.0     Leukocytes, UA Negative     Nitrite, UA Negative     Protein, UA Negative mg/dl      Glucose, UA Negative mg/dl      Ketones, UA Negative mg/dl      Urobilinogen, UA 0.2 E.U./dl      Bilirubin, UA Negative     Occult Blood, UA Negative    HS Troponin I 4hr [[id number]]     Lab Status: No result Specimen: Blood     HS Troponin 0hr (reflex protocol) [[id number]]  (Normal) Collected: 09/18/23 1745    Lab Status: Final result Specimen: Blood from Arm, Left Updated: 09/18/23 1819     hs TnI 0hr 8 ng/L     Lactic acid, plasma (w/reflex if result > 2.0) [[id number]]  (Normal) Collected: 09/18/23 1745    Lab Status: Final result Specimen: Blood from Arm, Left Updated: 09/18/23 1813     LACTIC ACID 1.0 mmol/L     Narrative:      Result may be elevated if tourniquet was used during collection.     Comprehensive metabolic panel [[id number]]  (Abnormal) Collected: 09/18/23 1745    Lab Status: Final result Specimen: Blood from Arm, Left Updated: 09/18/23 1813     Sodium 142 mmol/L      Potassium 3.3 mmol/L      Chloride 107 mmol/L      CO2 27 mmol/L      ANION GAP 8 mmol/L      BUN 16 mg/dL      Creatinine 1.62 mg/dL      Glucose 79 mg/dL      Calcium 8.7 mg/dL      AST 11 U/L      ALT 6 U/L      Alkaline Phosphatase 70 U/L      Total Protein 6.1 g/dL      Albumin 3.9 g/dL      Total Bilirubin 1.11 mg/dL      eGFR 41 ml/min/1.73sq m     Narrative:      [place] guidelines for Chronic Kidney Disease (CKD):   •  Stage 1 with normal or high GFR (GFR > 90 mL/min/1.73 square meters)  •  Stage 2 Mild CKD (GFR = 60-89 mL/min/1.73 square meters)  •  Stage 3A Moderate CKD (GFR = 45-59 mL/min/1.73 square meters)  •  Stage 3B Moderate CKD (GFR = 30-44 mL/min/1.73 square meters)  •  Stage 4 Severe CKD (GFR = 15-29 mL/min/1.73 square meters)  •  Stage 5 End Stage CKD (GFR <15 mL/min/1.73 square meters)  Note: GFR calculation is accurate only with a steady state creatinine    Lipase [[id number]]  (Normal) Collected: 09/18/23 1745    Lab Status: Final result Specimen: Blood from Arm, Left Updated: 09/18/23 1813     Lipase 56 u/L     CK [[id number]]  (Normal) Collected: 09/18/23 1745    Lab Status: Final result Specimen: Blood from Arm, Left Updated: 09/18/23 1813     Total  U/L     Protime-INR [[id number]]  (Abnormal) Collected: 09/18/23 1745    Lab Status: Final result Specimen: Blood from Arm, Left Updated: 09/18/23 1806     Protime 15.5 seconds      INR 1.25    APTT [[id number]]  (Normal) Collected: 09/18/23 1745    Lab Status: Final result Specimen: Blood from Arm, Left Updated: 09/18/23 1806     PTT 31 seconds     CBC and differential [[id number]] Collected: 09/18/23 1745    Lab Status: Final result Specimen: Blood from Arm, Left Updated: 09/18/23 1753     WBC 4.78 Thousand/uL      RBC 4.52 Million/uL      Hemoglobin 12.7 g/dL      Hematocrit 39.1 %      MCV 87 fL      MCH 28.1 pg      MCHC 32.5 g/dL      RDW 13.7 %      MPV 10.2 fL      Platelets 844 Thousands/uL      nRBC 0 /100 WBCs      Neutrophils Relative 56 %      Immat GRANS % 0 %      Lymphocytes Relative 30 %      Monocytes Relative 11 %      Eosinophils Relative 2 %      Basophils Relative 1 %      Neutrophils Absolute 2.73 Thousands/µL      Immature Grans Absolute 0.01 Thousand/uL      Lymphocytes Absolute 1.41 Thousands/µL      Monocytes Absolute 0.50 Thousand/µL      Eosinophils Absolute 0.09 Thousand/µL      Basophils Absolute 0.04 Thousands/µL                  CT abdomen pelvis wo contrast   Final Result by Timmothy Ganser, DO (09/18 2051)      Marked thickening of the gastric wall which may represent gastritis. Cannot rule out underlying malignancy. Follow-up with gastroenterology is recommended. Nonspecific right-sided perinephric stranding. Cannot rule out infection. Recommend follow-up with urinalysis      Unchanged pulmonary nodule      The study was marked in EPIC for immediate notification. Workstation performed: [id number]                    Procedures  ECG 12 Lead Documentation Only    Date/Time: 9/18/2023 5:54 PM    Performed by: Julia Dykes PA-C  Authorized by: Julia Dykes PA-C    Indications / Diagnosis:  HTN  ECG reviewed by me, the ED Provider: yes    Patient location:  ED  Previous ECG:     Comparison to cardiac monitor: Yes    Interpretation:     Interpretation: non-specific    Rate:     ECG rate:  63    ECG rate assessment: normal    Rhythm:     Rhythm: sinus rhythm    Ectopy:     Ectopy: none    QRS:     QRS axis:  Normal    QRS intervals:  Normal  Conduction:     Conduction: normal    ST segments:     ST segments:  Normal  T waves:     T waves: non-specific    Comments:      , QRS 82, QT/QTc 426/435; no acute ischemic changes. ED Course  ED Course as of 09/18/23 2143   Mon Sep 18, 2023   1702 Given past renal function, will pursue CT scan without contrast.   1754 Bladder scan = 13 cc; not c/w urinary retention.    1755 WBC: 4.78   1755 Hemoglobin: 12.7   1755 Platelet Count: 751   1808 POCT INR(!): 1.25   1808 PROTIME(!): 15.5   1808 PTT: 31   1817 Lipase: 56  Not c/w pancreatitis   1819 LACTIC ACID: 1.0   1819 Total CK: 128   1819 Glucose, Random: 79   1819 Creatinine(!): 1.62  Known CKD, improved from 2.33 three months ago   1819 BUN: 16   1819 Sodium: 142   1819 Potassium(!): 3.3   1819 Chloride: 107   1819 CO2: 27   1819 Anion Gap: 8   1820 Calcium: 8.7   1820 AST(!): 11   1820 ALT(!): 6   1820 Alkaline Phosphatase: 70   1820 Total Protein(!): 6.1   1820 Albumin: 3.9   1820 TOTAL BILIRUBIN(!): 1.11   1820 eGFR: 41   1820 hs TnI 0hr: 8  Not c/w ACS   1921 Pt taken to CT scan. 1949 High volume in ED resulting in delayed CT scan. 1956 Pt resting comfortably. He reports doing well at this time. Pain has improved. He is going to try and give urine sample. 2023 Delta 2hr hsTnI: 1  Not c/w ACS   2024 UA w Reflex to Microscopic w Reflex to Culture  Unremarkable; UA not suggestive of infection   2053 CT abdomen pelvis wo contrast  IMPRESSION:     Marked thickening of the gastric wall which may represent gastritis. Cannot rule out underlying malignancy. Follow-up with gastroenterology is recommended.     Nonspecific right-sided perinephric stranding. Cannot rule out infection. Recommend follow-up with urinalysis     Unchanged pulmonary nodule   2054 Pt updated on results of CT scan. He reports feeling significantly improved. He notes his pain from earlier has improved. He is urinating clear, yellow urine with noted discoloration or hematuria. He is requesting discharge at this time. We did discuss CT results. He notes having a recent EGD as well as colonoscopy from GI. He states he has been told he has gastritis. Unclear significance of right sided perinephric stranding. UA unremarkable. He is not having any further urinary complaints. ? possible passed renal stone  BP still elevated. He notes he hasn't taken his home meds yet today. 2139 PIV from LUE removed by me. Intact catheter. Strict return precautions outlined. Advised outpatient follow up with PCP or return to ER for change in condition as outlined. Pt verbalized understanding and had no further questions.           HEART Risk Score    Flowsheet Row Most Recent Value   Heart Score Risk Calculator    History 0 Filed at: 09/18/2023 1757   ECG 0 Filed at: 09/18/2023 1757   Age 2 Filed at: 09/18/2023 1757   Risk Factors 2 Filed at: 09/18/2023 1757   Troponin 0 Filed at: 09/18/2023 1757   HEART Score 4 Filed at: 09/18/2023 1757                        SBIRT 22yo+ Flowsheet Row Most Recent Value   Initial Alcohol Screen: US AUDIT-C     1. How often do you have a drink containing alcohol? 0 Filed at: 09/18/2023 1640   2. How many drinks containing alcohol do you have on a typical day you are drinking? 0 Filed at: 09/18/2023 1640   3a. Male UNDER 65: How often do you have five or more drinks on one occasion? 0 Filed at: 09/18/2023 1640   3b. FEMALE Any Age, or MALE 65+: How often do you have 4 or more drinks on one occassion? 0 Filed at: 09/18/2023 1640   Audit-C Score 0 Filed at: 09/18/2023 1640   MARY: How many times in the past year have you. .. Used an illegal drug or used a prescription medication for non-medical reasons? Never Filed at: 09/18/2023 1640                    Medical Decision Making  67 yo male presenting for evaluation of abdominal pain and urinary complaints. Will bladder scan to evaluate for urinary retention. Will obtain labs, urine and CT imaging. Pt noted to be hypertensive, will obtain EKG and troponin as well. Work up obtained as noted above. Bladder scan not c/w urinary retention. No noted leukocytosis. No anemia. Renal function is chronic, stable. Has underlying CKD, today's values improved from prior. Mild hypokalemia otherwise unremarkable electrolytes. EKG and serial troponins not c/w ACS. UA was obtained and unremarkable. No signs of infection. Urine noted to be yellow, clear. CT scan shows a non specific right renal stranding. Given normal UA and no urinary symptoms at present, will force to culture and defer antibiotic therapy at present. Pt comfortable with this. Reviewed gastric wall thickening noted on scan. Pt does see GI and indicates recent endoscopies. He is aware of need for follow up. BP remains hypertensive and pt notes he has not yet taken meds today. After discussion of work up, pt feels improved with resolution of his pain and requesting discharge home.   Will discharge with symptomatic management and outpatient follow up. Strict return precautions outlined. Please refer to above ER course for further details/discussion. Abdominal pain: acute illness or injury  Chronic hypertension: chronic illness or injury     Details: Advised to monitor, continue home medications and follow up with PCP for recheck. CKD (chronic kidney disease): chronic illness or injury     Details: Chronic, stable, improved from prior. Gastric wall thickening: acute illness or injury     Details: Continue PPI. Continue follow up with GI. Hypokalemia: acute illness or injury     Details: Orally repleted  Amount and/or Complexity of Data Reviewed  External Data Reviewed: labs and notes. Labs: ordered. Decision-making details documented in ED Course. Radiology: ordered. Decision-making details documented in ED Course. ECG/medicine tests: ordered and independent interpretation performed. Decision-making details documented in ED Course. Risk  OTC drugs. Prescription drug management. Disposition  Final diagnoses:   Abdominal pain   CKD (chronic kidney disease)   Hypokalemia   Chronic hypertension   Gastric wall thickening     Time reflects when diagnosis was documented in both MDM as applicable and the Disposition within this note     Time User Action Codes Description Comment    9/18/2023  8:45 PM Caleb Self [R10.9] Abdominal pain     9/18/2023  8:46 PM Betha Harp Add [N18.9] CKD (chronic kidney disease)     9/18/2023  8:46 PM Betha Harp Add [E87.6] Hypokalemia     9/18/2023  8:46 PM Betha Harp Add [I10] Chronic hypertension     9/18/2023  9:01 PM Betha Harp Add [K31.89] Gastric wall thickening       ED Disposition     ED Disposition   Discharge    Condition   Stable    Date/Time   Mon Sep 18, 2023  9:01 PM    Comment   [address] discharge to home/self care.                Follow-up Information     Follow up With Specialties Details Why Contact Info Additional [address] Raquel Thapa MD Internal Medicine Schedule an appointment as soon as possible for a visit   [facility] [address] [facility]  [address] [facility] Emergency Medicine  As needed [address] [facility] [id number]  300 [facility], [address] [place] [address],[address], [address]          Patient's Medications   Discharge Prescriptions    No medications on file       No discharge procedures on file.     PDMP Review     None          ED Provider  Electronically Signed by           Sumi Armijo PA-C  09/18/23 1726

## 2023-09-18 NOTE — PROGRESS NOTES
[facility]        NAME: Akosua Ma is a 68 y.o. male  : 1950    MRN: [id number]  DATE: 2023  TIME: 3:55 PM    Assessment and Plan   Pelvic pain [R10.2]  1. Pelvic pain  Transfer to other facility      2. Abnormal urine color  Transfer to other facility      3. History of kidney disease  Transfer to other facility            Patient Instructions       Follow up with PCP in 3-5 days. Proceed to  ER if symptoms worsen. Discussed with pt that he needs higher level of care, evaluation and treatment - he has chosen to go to [facility] ED for eval.  Informed pt to follow up with PCP after ED visit       Chief Complaint     Chief Complaint   Patient presents with   • Pelvic Pain     C/o bilateral pelvic pain onset "Thursday last week and my pee looks like chocolate". Denies fall/trauma. Denies PCP contact. Denies any OTC medications to aid symptoms. Denies fever. History of Present Illness       This is a 68year old male who comes to care now and states that his urine looks "like chocolate syrup". He states this started last Tuesday-Thursday. He states took cranberry juice w/o relief. He states that he has UTI issues in the past.  Denies fevers, chills, n/v/d. He has not called anyone about this. Pt does have history of kidney disease. Review of Systems   Review of Systems   Constitutional: Negative. HENT: Negative. Eyes: Negative. Respiratory: Negative. Cardiovascular: Negative. Gastrointestinal: Negative. Endocrine: Negative. Genitourinary:        "chocolate colored urine". Musculoskeletal: Negative. Skin: Negative. Allergic/Immunologic: Negative. Neurological: Negative. Hematological: Negative. Psychiatric/Behavioral: Negative.           Current Medications       Current Outpatient Medications:   •  albuterol (PROVENTIL HFA,VENTOLIN HFA) 90 mcg/act inhaler, Inhale 2 puffs 4 (four) times a day, Disp: , Rfl:   •  amLODIPine (NORVASC) 10 mg tablet, Take 10 mg by mouth daily, Disp: , Rfl:   •  buPROPion (WELLBUTRIN SR) 150 mg 12 hr tablet, Take 150 mg by mouth 2 (two) times a day, Disp: , Rfl:   •  clobetasol (TEMOVATE) 0.05 % ointment, Apply topically 2 (two) times a day, Disp: , Rfl:   •  cloNIDine (CATAPRES) 0.2 mg tablet, Take 0.2 mg by mouth daily, Disp: , Rfl:   •  cyclobenzaprine (FLEXERIL) 10 mg tablet, Take 1 tablet (10 mg total) by mouth 3 (three) times a day as needed for muscle spasms, Disp: 20 tablet, Rfl: 0  •  diclofenac sodium (VOLTAREN) 1 %, Apply 2 g topically 4 (four) times a day, Disp: , Rfl:   •  fluticasone (FLOVENT HFA) 110 MCG/ACT inhaler, Inhale 2 puffs 2 (two) times a day Rinse mouth after use., Disp: , Rfl:   •  fluticasone-salmeterol (ADVAIR, WIXELA) 500-50 mcg/dose inhaler, Inhale 1 puff 2 (two) times a day Rinse mouth after use., Disp: , Rfl:   •  gabapentin (NEURONTIN) 300 mg capsule, Take 300 mg by mouth 2 (two) times a day, Disp: , Rfl:   •  hydrochlorothiazide (HYDRODIURIL) 25 mg tablet, Take 2 tablets (50 mg total) by mouth daily, Disp: 30 tablet, Rfl: 0  •  ipratropium-albuterol (DUO-NEB) 0.5-2.5 mg/3 mL nebulizer solution, Take 3 mL by nebulization 4 (four) times a day, Disp: , Rfl:   •  losartan (COZAAR) 100 MG tablet, Take 100 mg by mouth daily, Disp: , Rfl:   •  mometasone (NASONEX) 50 mcg/act nasal spray, 2 sprays into each nostril daily, Disp: , Rfl:   •  omeprazole (PriLOSEC) 20 mg delayed release capsule, Take 20 mg by mouth 2 (two) times a day, Disp: , Rfl:   •  potassium chloride (K-DUR,KLOR-CON) 10 mEq tablet, Take 10 mEq by mouth daily, Disp: , Rfl:   •  potassium chloride (Klor-Con) 10 mEq tablet, TAKE ONE TABLET BY MOUTH DAILY AT 9AM (Patient not taking: Reported on 9/1/2022), Disp: , Rfl:   •  predniSONE 20 mg tablet, Take 20 mg by mouth see administration instructions As directed, Disp: , Rfl:   •  simvastatin (ZOCOR) 20 mg tablet, Take 20 mg by mouth, Disp: , Rfl:   •  tadalafil (CIALIS) 20 MG tablet, Take 20 mg by mouth daily as needed for erectile dysfunction, Disp: , Rfl:   •  triamcinolone (KENALOG) 0.1 % lotion, Apply topically 3 (three) times a day, Disp: , Rfl:   •  triamcinolone (KENALOG) 0.5 % cream, APPLY 1 APPLICATION TOPICALLY 3 (THREE) TIMES A DAY, Disp: , Rfl:   •  varenicline (CHANTIX) 1 mg tablet, Take 0.5 mg by mouth 2 (two) times a day, Disp: , Rfl:   •  zafirlukast (ACCOLATE) 20 MG tablet, Take 20 mg by mouth 2 (two) times a day, Disp: , Rfl:     Current Allergies     Allergies as of 09/18/2023 - Reviewed 09/18/2023   Allergen Reaction Noted   • 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            The following portions of the patient's history were reviewed and updated as appropriate: allergies, current medications, past family history, past medical history, past social history, past surgical history and problem list.     Past Medical History:   Diagnosis Date   • Asthma    • COPD (chronic obstructive pulmonary disease) ([address])    • Diabetes mellitus ([address])    • Hyperlipidemia    • Hypertension        Past Surgical History:   Procedure Laterality Date   • SHOULDER ARTHROPLASTY Right    • TOTAL HIP ARTHROPLASTY Right        History reviewed. No pertinent family history. Medications have been verified. Objective   BP (!) 168/103   Pulse 78   Temp 98.2 °F (36.8 °C) (Temporal)   Resp 18   SpO2 99%   No LMP for male patient. Physical Exam     Physical Exam  Vitals and nursing note reviewed. Constitutional:       General: He is not in acute distress. Appearance: Normal appearance. He is normal weight. He is not ill-appearing, toxic-appearing or diaphoretic. HENT:      Head: Normocephalic and atraumatic. Nose: Nose normal.      Mouth/Throat:      Mouth: Mucous membranes are moist.   Eyes:      Extraocular Movements: Extraocular movements intact. Cardiovascular:      Rate and Rhythm: Normal rate and regular rhythm. Pulses: Normal pulses. Heart sounds: Normal heart sounds. Pulmonary:      Effort: Pulmonary effort is normal.      Breath sounds: Normal breath sounds. Abdominal:      Palpations: Abdomen is soft. Tenderness: There is no right CVA tenderness or left CVA tenderness. Musculoskeletal:         General: Normal range of motion. Cervical back: Normal range of motion and neck supple. Skin:     General: Skin is warm and dry. Capillary Refill: Capillary refill takes less than 2 seconds. Neurological:      General: No focal deficit present. Mental Status: He is alert and oriented to person, place, and time. Psychiatric:         Mood and Affect: Mood normal.         Behavior: Behavior normal.         Thought Content:  Thought content normal.         Judgment: Judgment normal.

## 2023-09-19 NOTE — DISCHARGE INSTRUCTIONS
Continue home medications as prescribed. Continue to monitor your blood pressure. Follow up with your PCP as well as GI specialist.  Return to ER as needed.

## 2023-09-21 LAB
ATRIAL RATE: 63 BPM
BACTERIA UR CULT: NORMAL
P AXIS: 71 DEGREES
PR INTERVAL: 196 MS
QRS AXIS: 4 DEGREES
QRSD INTERVAL: 82 MS
QT INTERVAL: 426 MS
QTC INTERVAL: 435 MS
T WAVE AXIS: 60 DEGREES
VENTRICULAR RATE: 63 BPM

## 2023-09-21 PROCEDURE — 93010 ELECTROCARDIOGRAM REPORT: CPT | Performed by: INTERNAL MEDICINE

## 2023-11-13 ENCOUNTER — TELEPHONE (OUTPATIENT)
Dept: ADMINISTRATIVE | Facility: OTHER | Age: 73
End: 2023-11-13

## 2023-11-13 ENCOUNTER — OFFICE VISIT (OUTPATIENT)
Dept: FAMILY MEDICINE CLINIC | Facility: CLINIC | Age: 73
End: 2023-11-13
Payer: COMMERCIAL

## 2023-11-13 VITALS
HEART RATE: 85 BPM | WEIGHT: 179.4 LBS | SYSTOLIC BLOOD PRESSURE: 120 MMHG | DIASTOLIC BLOOD PRESSURE: 74 MMHG | TEMPERATURE: 97.6 F | BODY MASS INDEX: 21.84 KG/M2 | OXYGEN SATURATION: 99 % | HEIGHT: 76 IN

## 2023-11-13 DIAGNOSIS — J44.9 CHRONIC OBSTRUCTIVE PULMONARY DISEASE, UNSPECIFIED COPD TYPE (HCC): ICD-10-CM

## 2023-11-13 DIAGNOSIS — N18.32 STAGE 3B CHRONIC KIDNEY DISEASE (HCC): Primary | ICD-10-CM

## 2023-11-13 DIAGNOSIS — N25.81 SECONDARY HYPERPARATHYROIDISM (HCC): ICD-10-CM

## 2023-11-13 DIAGNOSIS — R73.03 PREDIABETES: ICD-10-CM

## 2023-11-13 DIAGNOSIS — I10 ESSENTIAL (PRIMARY) HYPERTENSION: ICD-10-CM

## 2023-11-13 PROBLEM — E04.2 MULTINODULAR GOITER: Status: ACTIVE | Noted: 2018-06-19

## 2023-11-13 PROBLEM — E87.6 HYPOKALEMIA: Chronic | Status: ACTIVE | Noted: 2023-04-14

## 2023-11-13 PROBLEM — N28.1 COMPLEX RENAL CYST: Status: ACTIVE | Noted: 2023-10-20

## 2023-11-13 PROBLEM — D50.0 IRON DEFICIENCY ANEMIA DUE TO CHRONIC BLOOD LOSS: Status: ACTIVE | Noted: 2018-10-17

## 2023-11-13 PROBLEM — E78.5 HYPERLIPEMIA: Status: ACTIVE | Noted: 2018-06-19

## 2023-11-13 PROBLEM — I16.0 HYPERTENSIVE URGENCY: Status: RESOLVED | Noted: 2020-01-18 | Resolved: 2023-11-13

## 2023-11-13 PROCEDURE — 99203 OFFICE O/P NEW LOW 30 MIN: CPT | Performed by: FAMILY MEDICINE

## 2023-11-13 NOTE — TELEPHONE ENCOUNTER
Upon review of the In Basket request and the patient's chart, initial outreach has been made via fax to facility. Please see Contacts section for details.      Thank you  Pipo Burnett MA

## 2023-11-13 NOTE — PROGRESS NOTES
Name: Beverly Mccoy      : 1950      MRN: [id number]  Encounter Provider: Sly Olsen DO  Encounter Date: 2023   Encounter department: [facility] [address]   Patient does not wish to have an EpiPen. Referral made to endocrinology for his history of prediabetes and secondary hyperparathyroidism. Referral also made to pulmonology for his history of COPD. Follow-up with nephrology as scheduled. Continue same medications. Follow-up here in 3 months or as needed. 1. Stage 3b chronic kidney disease ([facility])    2. Secondary hyperparathyroidism ([facility])  -     Ambulatory Referral to Endocrinology; Future    3. Prediabetes  -     Ambulatory Referral to Endocrinology; Future    4. Essential (primary) hypertension    5. Chronic obstructive pulmonary disease, unspecified COPD type ([facility])  -     Ambulatory Referral to Pulmonology; Future        Depression Screening and Follow-up Plan: Patient was screened for depression during today's encounter. They screened negative with a PHQ-2 score of 2. Subjective     New patient. Denies any chest pain or shortness of breath. Has a history of chronic renal disease, hyperparathyroidism, prediabetes, hypertension and COPD. He does still smoke, no desire to quit. He does follow-up with nephrology in [facility]. Review of Systems   Constitutional: Negative. Respiratory: Negative. Cardiovascular: Negative. Gastrointestinal: Negative. Genitourinary: Negative.         Past Medical History:   Diagnosis Date   • Asthma    • COPD (chronic obstructive pulmonary disease) ([facility])    • Diabetes mellitus ([facility])    • Hyperlipidemia    • Hypertension      Past Surgical History:   Procedure Laterality Date   • SHOULDER ARTHROPLASTY Right    • TOTAL HIP ARTHROPLASTY Right      Family History   Problem Relation Age of Onset   • Breast cancer Mother    • Hypertension Brother      Social History     Socioeconomic History   • Marital status:  Spouse name: None   • Number of children: None   • Years of education: None   • Highest education level: None   Occupational History   • None   Tobacco Use   • Smoking status: Every Day     Packs/day: 0.25     Types: Cigars, Cigarettes   • Smokeless tobacco: Never   • Tobacco comments:     3 cigars  and pipe   Vaping Use   • Vaping Use: Never used   Substance and Sexual Activity   • Alcohol use: Yes     Comment: occasional weekend use   • Drug use: Never   • Sexual activity: Not Currently   Other Topics Concern   • None   Social History Narrative   • None     Social Determinants of Health     Financial Resource Strain: Not on file   Food Insecurity: Not on file   Transportation Needs: Not on file   Physical Activity: Not on file   Stress: Not on file   Social Connections: Not on file   Intimate Partner Violence: Not on file   Housing Stability: Not on file     Current Outpatient Medications on File Prior to Visit   Medication Sig   • albuterol (PROVENTIL HFA,VENTOLIN HFA) 90 mcg/act inhaler Inhale 2 puffs 4 (four) times a day   • amLODIPine (NORVASC) 10 mg tablet Take 10 mg by mouth daily   • buPROPion (WELLBUTRIN SR) 150 mg 12 hr tablet Take 150 mg by mouth 2 (two) times a day   • clobetasol (TEMOVATE) 0.05 % ointment Apply topically 2 (two) times a day   • cloNIDine (CATAPRES) 0.2 mg tablet Take 0.2 mg by mouth daily   • cyclobenzaprine (FLEXERIL) 10 mg tablet Take 1 tablet (10 mg total) by mouth 3 (three) times a day as needed for muscle spasms   • diclofenac sodium (VOLTAREN) 1 % Apply 2 g topically 4 (four) times a day   • fluticasone (FLOVENT HFA) 110 MCG/ACT inhaler Inhale 2 puffs 2 (two) times a day Rinse mouth after use. • fluticasone-salmeterol (ADVAIR, WIXELA) 500-50 mcg/dose inhaler Inhale 1 puff 2 (two) times a day Rinse mouth after use.    • gabapentin (NEURONTIN) 300 mg capsule Take 300 mg by mouth 2 (two) times a day   • hydrochlorothiazide (HYDRODIURIL) 25 mg tablet Take 2 tablets (50 mg total) by mouth daily   • ipratropium-albuterol (DUO-NEB) 0.5-2.5 mg/3 mL nebulizer solution Take 3 mL by nebulization 4 (four) times a day   • losartan (COZAAR) 100 MG tablet Take 100 mg by mouth daily   • mometasone (NASONEX) 50 mcg/act nasal spray 2 sprays into each nostril daily   • omeprazole (PriLOSEC) 20 mg delayed release capsule Take 20 mg by mouth 2 (two) times a day   • potassium chloride (K-DUR,KLOR-CON) 10 mEq tablet Take 10 mEq by mouth daily   • predniSONE 20 mg tablet Take 20 mg by mouth see administration instructions As directed   • simvastatin (ZOCOR) 20 mg tablet Take 20 mg by mouth   • tadalafil (CIALIS) 20 MG tablet Take 20 mg by mouth daily as needed for erectile dysfunction   • triamcinolone (KENALOG) 0.1 % lotion Apply topically 3 (three) times a day   • triamcinolone (KENALOG) 0.5 % cream APPLY 1 APPLICATION TOPICALLY 3 (THREE) TIMES A DAY   • zafirlukast (ACCOLATE) 20 MG tablet Take 20 mg by mouth 2 (two) times a day   • [DISCONTINUED] varenicline (CHANTIX) 1 mg tablet Take 0.5 mg by mouth 2 (two) times a day   • [DISCONTINUED] potassium chloride (Klor-Con) 10 mEq tablet TAKE ONE TABLET BY MOUTH DAILY AT 9AM (Patient not taking: Reported on 9/1/2022)     Allergies   Allergen Reactions   • Shrimp Extract Allergy Skin Test - Food Allergy Anaphylaxis     And Lobster  Throat closes     Immunization History   Administered Date(s) Administered   • DT (pediatric) 05/25/2012   • Influenza Split 03/26/2012   • Influenza, seasonal, injectable 11/10/2010   • Pneumococcal Conjugate 13-Valent 06/25/2019   • Pneumococcal Polysaccharide PPV23 11/12/2009   • Tdap 06/25/2019       Objective     /74   Pulse 85   Temp 97.6 °F (36.4 °C)   Ht 6' 4" (1.93 m)   Wt 81.4 kg (179 lb 6.4 oz)   SpO2 99%   BMI 21.84 kg/m²     Physical Exam  Vitals reviewed. Constitutional:       General: He is not in acute distress. Appearance: Normal appearance. He is well-developed.  He is not ill-appearing, toxic-appearing or diaphoretic. HENT:      Head: Normocephalic and atraumatic. Eyes:      Conjunctiva/sclera: Conjunctivae normal.   Cardiovascular:      Rate and Rhythm: Normal rate and regular rhythm. Heart sounds: Normal heart sounds. No murmur heard. No friction rub. No gallop. Pulmonary:      Effort: Pulmonary effort is normal. No respiratory distress. Breath sounds: Normal breath sounds. No wheezing, rhonchi or rales. Musculoskeletal:      Right lower leg: No edema. Left lower leg: No edema. Neurological:      General: No focal deficit present. Mental Status: He is alert and oriented to person, place, and time. Psychiatric:         Mood and Affect: Mood normal.         Behavior: Behavior normal.         Thought Content:  Thought content normal.         Judgment: Judgment normal.       Ofelia Dry, DO

## 2023-11-13 NOTE — LETTER
Procedure Request Form: Colonoscopy      Date Requested: 23  Patient: Shmanuelito Daniels  Patient : 1950   Referring Provider: Mike Mccormick, DO        Date of Procedure ______________________________       The above patient has informed us that they have completed their   most recent Colonoscopy at your facility. Please complete   this form and attach all corresponding procedure reports/results. Comments __________________________________________________________  ____________________________________________________________________  ____________________________________________________________________  ____________________________________________________________________    Facility Completing Procedure _________________________________________    Form Completed By (print name) _______________________________________      Signature __________________________________________________________      These reports are needed for  compliance. Please fax this completed form and a copy of the procedure report to our office located at [address] [place] [address] as soon as possible to Fax [phone] attention Lulu: Phone [phone]    We thank you for your assistance in treating our mutual patient.

## 2023-11-13 NOTE — TELEPHONE ENCOUNTER
----- Message from Pancho Gomes sent at 11/13/2023  9:48 AM EST -----  Regarding: colonoscopy  11/13/23 9:49 AM    Hello, our patient Kemal Hernandez has had CRC: Colonoscopy completed/performed. Please assist in updating the patient chart by making an External outreach to [facility] facility located in [facility]. The date of service is 2023.     Thank you,  Pancho Reyes Lorenzo

## 2023-11-14 ENCOUNTER — TELEPHONE (OUTPATIENT)
Dept: PULMONOLOGY | Facility: CLINIC | Age: 73
End: 2023-11-14

## 2023-11-14 ENCOUNTER — TELEPHONE (OUTPATIENT)
Dept: ENDOCRINOLOGY | Facility: CLINIC | Age: 73
End: 2023-11-14

## 2023-11-15 NOTE — TELEPHONE ENCOUNTER
Upon review of the In Basket request we were able to locate, review, and update the patient chart as requested for CRC: Colonoscopy. Any additional questions or concerns should be emailed to the Practice Liaisons via the appropriate education email address, please do not reply via In Basket.     Thank you  Lashawn Churchill MA

## 2024-04-18 ENCOUNTER — TELEPHONE (OUTPATIENT)
Dept: FAMILY MEDICINE CLINIC | Facility: CLINIC | Age: 74
End: 2024-04-18

## 2024-04-18 NOTE — TELEPHONE ENCOUNTER
. I need all my medications and 90 day refill. Pharmacist name is [facility].  [address] in the [place]. Telephone number is  (put in chart). If you have any questions, please give me a call. My number is [phone]. Thank you.

## 2024-05-28 ENCOUNTER — TELEPHONE (OUTPATIENT)
Age: 74
End: 2024-05-28

## 2024-07-26 ENCOUNTER — TELEPHONE (OUTPATIENT)
Age: 74
End: 2024-07-26

## 2024-07-26 ENCOUNTER — OFFICE VISIT (OUTPATIENT)
Dept: FAMILY MEDICINE CLINIC | Facility: CLINIC | Age: 74
End: 2024-07-26
Payer: COMMERCIAL

## 2024-07-26 VITALS
OXYGEN SATURATION: 98 % | RESPIRATION RATE: 16 BRPM | WEIGHT: 176.4 LBS | SYSTOLIC BLOOD PRESSURE: 182 MMHG | HEART RATE: 71 BPM | DIASTOLIC BLOOD PRESSURE: 98 MMHG | BODY MASS INDEX: 21.93 KG/M2 | HEIGHT: 75 IN

## 2024-07-26 DIAGNOSIS — R04.0 EPISTAXIS: ICD-10-CM

## 2024-07-26 DIAGNOSIS — R63.0 ANOREXIA: ICD-10-CM

## 2024-07-26 DIAGNOSIS — Z72.0 TOBACCO ABUSE: ICD-10-CM

## 2024-07-26 DIAGNOSIS — M79.621 PAIN OF RIGHT UPPER ARM: ICD-10-CM

## 2024-07-26 DIAGNOSIS — M25.511 ACUTE PAIN OF RIGHT SHOULDER: ICD-10-CM

## 2024-07-26 DIAGNOSIS — E55.9 VITAMIN D DEFICIENCY: ICD-10-CM

## 2024-07-26 DIAGNOSIS — J44.9 CHRONIC OBSTRUCTIVE PULMONARY DISEASE, UNSPECIFIED COPD TYPE (HCC): ICD-10-CM

## 2024-07-26 DIAGNOSIS — J45.40 MODERATE PERSISTENT ASTHMA WITHOUT COMPLICATION: ICD-10-CM

## 2024-07-26 DIAGNOSIS — N52.9 ERECTILE DYSFUNCTION, UNSPECIFIED ERECTILE DYSFUNCTION TYPE: ICD-10-CM

## 2024-07-26 DIAGNOSIS — R73.03 PREDIABETES: ICD-10-CM

## 2024-07-26 DIAGNOSIS — R03.0 ELEVATED BLOOD PRESSURE READING: Primary | ICD-10-CM

## 2024-07-26 DIAGNOSIS — L30.9 ECZEMA, UNSPECIFIED TYPE: ICD-10-CM

## 2024-07-26 DIAGNOSIS — E78.5 DYSLIPIDEMIA: ICD-10-CM

## 2024-07-26 DIAGNOSIS — N18.32 STAGE 3B CHRONIC KIDNEY DISEASE (HCC): ICD-10-CM

## 2024-07-26 DIAGNOSIS — K21.9 GERD WITHOUT ESOPHAGITIS: ICD-10-CM

## 2024-07-26 PROCEDURE — G2211 COMPLEX E/M VISIT ADD ON: HCPCS | Performed by: FAMILY MEDICINE

## 2024-07-26 PROCEDURE — 99215 OFFICE O/P EST HI 40 MIN: CPT | Performed by: FAMILY MEDICINE

## 2024-07-26 RX ORDER — METHYLPREDNISOLONE 4 MG/1
TABLET ORAL
Qty: 21 EACH | Refills: 0 | Status: SHIPPED | OUTPATIENT
Start: 2024-07-26

## 2024-07-26 RX ORDER — FLUTICASONE PROPIONATE AND SALMETEROL 500; 50 UG/1; UG/1
1 POWDER RESPIRATORY (INHALATION) 2 TIMES DAILY
Qty: 60 BLISTER | Refills: 3 | Status: SHIPPED | OUTPATIENT
Start: 2024-07-26

## 2024-07-26 RX ORDER — ALBUTEROL SULFATE 90 UG/1
2 AEROSOL, METERED RESPIRATORY (INHALATION) 4 TIMES DAILY
Qty: 18 G | Refills: 3 | Status: SHIPPED | OUTPATIENT
Start: 2024-07-26

## 2024-07-26 RX ORDER — POTASSIUM CHLORIDE 750 MG/1
10 TABLET, EXTENDED RELEASE ORAL DAILY
Qty: 90 TABLET | Refills: 1 | Status: SHIPPED | OUTPATIENT
Start: 2024-07-26

## 2024-07-26 RX ORDER — OMEPRAZOLE 20 MG/1
20 CAPSULE, DELAYED RELEASE ORAL 2 TIMES DAILY
Qty: 180 CAPSULE | Refills: 0 | Status: SHIPPED | OUTPATIENT
Start: 2024-07-26

## 2024-07-26 RX ORDER — SIMVASTATIN 20 MG
20 TABLET ORAL
Qty: 90 TABLET | Refills: 1 | Status: SHIPPED | OUTPATIENT
Start: 2024-07-26

## 2024-07-26 RX ORDER — CLONIDINE HYDROCHLORIDE 0.2 MG/1
0.2 TABLET ORAL DAILY
Qty: 90 TABLET | Refills: 1 | Status: SHIPPED | OUTPATIENT
Start: 2024-07-26

## 2024-07-26 RX ORDER — BUPROPION HYDROCHLORIDE 150 MG/1
150 TABLET, EXTENDED RELEASE ORAL 2 TIMES DAILY
Qty: 180 TABLET | Refills: 0 | Status: SHIPPED | OUTPATIENT
Start: 2024-07-26

## 2024-07-26 RX ORDER — FLUTICASONE PROPIONATE 110 UG/1
2 AEROSOL, METERED RESPIRATORY (INHALATION) 2 TIMES DAILY
Qty: 12 G | Refills: 3 | Status: SHIPPED | OUTPATIENT
Start: 2024-07-26

## 2024-07-26 RX ORDER — CLOBETASOL PROPIONATE 0.5 MG/G
OINTMENT TOPICAL 2 TIMES DAILY
Qty: 60 G | Refills: 3 | Status: SHIPPED | OUTPATIENT
Start: 2024-07-26

## 2024-07-26 RX ORDER — GABAPENTIN 300 MG/1
300 CAPSULE ORAL 2 TIMES DAILY
Qty: 180 CAPSULE | Refills: 0 | Status: SHIPPED | OUTPATIENT
Start: 2024-07-26

## 2024-07-26 RX ORDER — PREDNISONE 20 MG/1
20 TABLET ORAL SEE ADMIN INSTRUCTIONS
Status: CANCELLED | OUTPATIENT
Start: 2024-07-26

## 2024-07-26 RX ORDER — MEGESTROL ACETATE 40 MG/1
40 TABLET ORAL DAILY
Qty: 90 TABLET | Refills: 1 | Status: SHIPPED | OUTPATIENT
Start: 2024-07-26

## 2024-07-26 RX ORDER — LOSARTAN POTASSIUM 100 MG/1
100 TABLET ORAL DAILY
Qty: 90 TABLET | Refills: 1 | Status: SHIPPED | OUTPATIENT
Start: 2024-07-26

## 2024-07-26 RX ORDER — MOMETASONE FUROATE MONOHYDRATE 50 UG/1
2 SPRAY, METERED NASAL DAILY
Qty: 3 G | Refills: 1 | Status: SHIPPED | OUTPATIENT
Start: 2024-07-26

## 2024-07-26 RX ORDER — CYCLOBENZAPRINE HCL 10 MG
10 TABLET ORAL 3 TIMES DAILY PRN
Qty: 30 TABLET | Refills: 0 | Status: SHIPPED | OUTPATIENT
Start: 2024-07-26

## 2024-07-26 RX ORDER — ZAFIRLUKAST 20 MG/1
20 TABLET, FILM COATED ORAL 2 TIMES DAILY
Qty: 60 TABLET | Refills: 3 | Status: SHIPPED | OUTPATIENT
Start: 2024-07-26

## 2024-07-26 RX ORDER — TRIAMCINOLONE ACETONIDE 1 MG/ML
LOTION TOPICAL 3 TIMES DAILY
Qty: 60 ML | Refills: 1 | Status: SHIPPED | OUTPATIENT
Start: 2024-07-26

## 2024-07-26 RX ORDER — IPRATROPIUM BROMIDE AND ALBUTEROL SULFATE 2.5; .5 MG/3ML; MG/3ML
3 SOLUTION RESPIRATORY (INHALATION) 4 TIMES DAILY
Qty: 180 ML | Refills: 1 | Status: SHIPPED | OUTPATIENT
Start: 2024-07-26

## 2024-07-26 RX ORDER — AMLODIPINE BESYLATE 10 MG/1
10 TABLET ORAL DAILY
Qty: 90 TABLET | Refills: 1 | Status: SHIPPED | OUTPATIENT
Start: 2024-07-26

## 2024-07-26 RX ORDER — TADALAFIL 20 MG/1
20 TABLET ORAL DAILY PRN
Qty: 10 TABLET | Refills: 5 | Status: SHIPPED | OUTPATIENT
Start: 2024-07-26

## 2024-07-26 RX ORDER — HYDROCHLOROTHIAZIDE 25 MG/1
50 TABLET ORAL DAILY
Qty: 60 TABLET | Refills: 3 | Status: SHIPPED | OUTPATIENT
Start: 2024-07-26

## 2024-07-26 RX ORDER — VARENICLINE TARTRATE 1 MG/1
1 TABLET, FILM COATED ORAL 2 TIMES DAILY
COMMUNITY

## 2024-07-26 NOTE — PROGRESS NOTES
Assessment/Plan: Elevated blood pressure reading with blood pressure 182/98.  The patient states that he ran out of his blood pressure medication we will reorder his blood pressure medication the patient does monitor his blood pressure advised the patient after taking his blood pressure medication if the systolic is above 130 or diastolic above 80 to contact us otherwise we will recheck him in 1 week blood pressure currently is treated with Norvasc 10 mg Catapres 0.2 mg daily Cozaar 100 mg daily    Epistaxis the patient uses Nasonex daily was advised to apply Vaseline petroleum jelly to the nostrils bilaterally at bedtime        History of right shoulder replacement in the 90s with pain and right shoulder.  2023 x-ray of the right shoulder reviewed as unremarkable post arthroplasty.  We will provide the patient with a Medrol Dosepak for the shoulder pain    COPD with asthmatic component the patient uses Advair as a maintenance inhaler and albuterol as a rescue inhaler uses DuoNeb and the mini nebulizer machine        Stage IIIb chronic kidney disease    Tobacco use disorder the patient currently has been taking Wellbutrin  patient states he smokes approximately 8 cigarettes/day    GERD without esophagitis symptoms are minimized with Prilosec 20 mg daily    Chronic kidney disease stage III    Eczema treated with Kenalog    Anorexia will provide Megace 40 mg daily    I spent approximately 45 minutes examining the patient doing review of systems reviewing the history reviewing x-ray studies and reviewing lab    Problem List Items Addressed This Visit     Epistaxis    Relevant Medications    hydroCHLOROthiazide 25 mg tablet    mometasone (NASONEX) 50 mcg/act nasal spray    Acute pain of right shoulder    Relevant Medications    cyclobenzaprine (FLEXERIL) 10 mg tablet    hydroCHLOROthiazide 25 mg tablet    methylPREDNISolone 4 MG tablet therapy pack    Tobacco abuse    Relevant Medications    buPROPion (WELLBUTRIN  SR) 150 mg 12 hr tablet    hydroCHLOROthiazide 25 mg tablet    Asthma    Relevant Medications    albuterol (PROVENTIL HFA,VENTOLIN HFA) 90 mcg/act inhaler    fluticasone (FLOVENT HFA) 110 MCG/ACT inhaler    Fluticasone-Salmeterol (Advair Diskus) 500-50 mcg/dose inhaler    hydroCHLOROthiazide 25 mg tablet    ipratropium-albuterol (DUO-NEB) 0.5-2.5 mg/3 mL nebulizer solution    zafirlukast (ACCOLATE) 20 MG tablet    Other Relevant Orders    CBC and differential    Comprehensive metabolic panel    CKD (chronic kidney disease) stage 3, GFR 30-59 ml/min (Formerly [facility])    Relevant Medications    hydroCHLOROthiazide 25 mg tablet    Eczema    Relevant Medications    clobetasol (TEMOVATE) 0.05 % ointment    cloNIDine (CATAPRES) 0.2 mg tablet    hydroCHLOROthiazide 25 mg tablet    triamcinolone (KENALOG) 0.1 % lotion    COPD (chronic obstructive pulmonary disease) (Formerly [facility])    Relevant Medications    albuterol (PROVENTIL HFA,VENTOLIN HFA) 90 mcg/act inhaler    fluticasone (FLOVENT HFA) 110 MCG/ACT inhaler    Fluticasone-Salmeterol (Advair Diskus) 500-50 mcg/dose inhaler    ipratropium-albuterol (DUO-NEB) 0.5-2.5 mg/3 mL nebulizer solution    mometasone (NASONEX) 50 mcg/act nasal spray    zafirlukast (ACCOLATE) 20 MG tablet    methylPREDNISolone 4 MG tablet therapy pack    Prediabetes    Relevant Orders    Hemoglobin A1c (w/out EAG)   Other Visit Diagnoses     Elevated blood pressure reading    -  Primary    Relevant Medications    amLODIPine (NORVASC) 10 mg tablet    cloNIDine (CATAPRES) 0.2 mg tablet    hydroCHLOROthiazide 25 mg tablet    losartan (COZAAR) 100 MG tablet    potassium chloride (Klor-Con M10) 10 mEq tablet    Pain of right upper arm        Relevant Medications    cyclobenzaprine (FLEXERIL) 10 mg tablet    gabapentin (NEURONTIN) 300 mg capsule    Dyslipidemia        Relevant Medications    simvastatin (ZOCOR) 20 mg tablet    Other Relevant Orders    Lipid Panel with Direct LDL reflex    Vitamin D deficiency        Relevant  Orders    Vitamin D 25 hydroxy    Anorexia        Relevant Medications    megestrol (MEGACE) 40 mg tablet    Erectile dysfunction, unspecified erectile dysfunction type        Relevant Medications    tadalafil (CIALIS) 20 MG tablet    GERD without esophagitis        Relevant Medications    omeprazole (PriLOSEC) 20 mg delayed release capsule           Diagnoses and all orders for this visit:    Elevated blood pressure reading  -     amLODIPine (NORVASC) 10 mg tablet; Take 1 tablet (10 mg total) by mouth daily  -     cloNIDine (CATAPRES) 0.2 mg tablet; Take 1 tablet (0.2 mg total) by mouth daily  -     hydroCHLOROthiazide 25 mg tablet; Take 2 tablets (50 mg total) by mouth daily  -     losartan (COZAAR) 100 MG tablet; Take 1 tablet (100 mg total) by mouth daily  -     potassium chloride (Klor-Con M10) 10 mEq tablet; Take 1 tablet (10 mEq total) by mouth daily    Epistaxis  -     hydroCHLOROthiazide 25 mg tablet; Take 2 tablets (50 mg total) by mouth daily  -     mometasone (NASONEX) 50 mcg/act nasal spray; 2 sprays into each nostril daily    Pain of right upper arm  -     cyclobenzaprine (FLEXERIL) 10 mg tablet; Take 1 tablet (10 mg total) by mouth 3 (three) times a day as needed for muscle spasms  -     gabapentin (NEURONTIN) 300 mg capsule; Take 1 capsule (300 mg total) by mouth 2 (two) times a day    Acute pain of right shoulder  -     cyclobenzaprine (FLEXERIL) 10 mg tablet; Take 1 tablet (10 mg total) by mouth 3 (three) times a day as needed for muscle spasms  -     hydroCHLOROthiazide 25 mg tablet; Take 2 tablets (50 mg total) by mouth daily  -     methylPREDNISolone 4 MG tablet therapy pack; Use as directed on package    Tobacco abuse  -     buPROPion (WELLBUTRIN SR) 150 mg 12 hr tablet; Take 1 tablet (150 mg total) by mouth 2 (two) times a day  -     hydroCHLOROthiazide 25 mg tablet; Take 2 tablets (50 mg total) by mouth daily    Moderate persistent asthma without complication  -     albuterol (PROVENTIL  HFA,VENTOLIN HFA) 90 mcg/act inhaler; Inhale 2 puffs 4 (four) times a day  -     fluticasone (FLOVENT HFA) 110 MCG/ACT inhaler; Inhale 2 puffs 2 (two) times a day Rinse mouth after use.  -     Fluticasone-Salmeterol (Advair Diskus) 500-50 mcg/dose inhaler; Inhale 1 puff 2 (two) times a day Rinse mouth after use.  -     hydroCHLOROthiazide 25 mg tablet; Take 2 tablets (50 mg total) by mouth daily  -     zafirlukast (ACCOLATE) 20 MG tablet; Take 1 tablet (20 mg total) by mouth 2 (two) times a day  -     CBC and differential; Future  -     Comprehensive metabolic panel; Future    Chronic obstructive pulmonary disease, unspecified COPD type (HCC)  -     ipratropium-albuterol (DUO-NEB) 0.5-2.5 mg/3 mL nebulizer solution; Take 3 mL by nebulization 4 (four) times a day    Eczema, unspecified type  -     clobetasol (TEMOVATE) 0.05 % ointment; Apply topically 2 (two) times a day  -     cloNIDine (CATAPRES) 0.2 mg tablet; Take 1 tablet (0.2 mg total) by mouth daily  -     hydroCHLOROthiazide 25 mg tablet; Take 2 tablets (50 mg total) by mouth daily  -     triamcinolone (KENALOG) 0.1 % lotion; Apply topically 3 (three) times a day    Stage 3b chronic kidney disease (HCC)    Dyslipidemia  -     simvastatin (ZOCOR) 20 mg tablet; Take 1 tablet (20 mg total) by mouth daily at bedtime  -     Lipid Panel with Direct LDL reflex; Future    Prediabetes  -     Hemoglobin A1c (w/out EAG); Future    Vitamin D deficiency  -     Vitamin D 25 hydroxy; Future    Anorexia  -     megestrol (MEGACE) 40 mg tablet; Take 1 tablet (40 mg total) by mouth daily    Erectile dysfunction, unspecified erectile dysfunction type  -     tadalafil (CIALIS) 20 MG tablet; Take 1 tablet (20 mg total) by mouth daily as needed for erectile dysfunction    GERD without esophagitis  -     omeprazole (PriLOSEC) 20 mg delayed release capsule; Take 1 capsule (20 mg total) by mouth 2 (two) times a day    Other orders  -     varenicline (CHANTIX) 1 mg tablet; Take 1 mg by  mouth 2 (two) times a day Takes as needed        No problem-specific Assessment & Plan notes found for this encounter.      PHQ-2/9 Depression Screening            Body mass index is 22.05 kg/m².    BMI Counseling: Body mass index is 22.05 kg/m². The BMI     Subjective:      Patient ID: Yovani Sánchez is a 73 y.o. male.    HPI    The following portions of the patient's history were reviewed and updated as appropriate:   He has a past medical history of Asthma, COPD (chronic obstructive pulmonary disease) (HCC), Diabetes mellitus (HCC), Hyperlipidemia, and Hypertension.,  does not have any pertinent problems on file.,   has a past surgical history that includes Total hip arthroplasty (Right) and SHOULDER ARTHROPLASTY (Right).,  family history includes Breast cancer in his mother; Hypertension in his brother.,   reports that he has been smoking cigars and cigarettes. He started smoking about 60 years ago. He has a 15.1 pack-year smoking history. He has never used smokeless tobacco. He reports current alcohol use. He reports that he does not use drugs.,  is allergic to shrimp extract allergy skin test - food allergy..  Current Outpatient Medications   Medication Sig Dispense Refill   • albuterol (PROVENTIL HFA,VENTOLIN HFA) 90 mcg/act inhaler Inhale 2 puffs 4 (four) times a day 18 g 3   • amLODIPine (NORVASC) 10 mg tablet Take 1 tablet (10 mg total) by mouth daily 90 tablet 1   • buPROPion (WELLBUTRIN SR) 150 mg 12 hr tablet Take 1 tablet (150 mg total) by mouth 2 (two) times a day 180 tablet 0   • clobetasol (TEMOVATE) 0.05 % ointment Apply topically 2 (two) times a day 60 g 3   • cloNIDine (CATAPRES) 0.2 mg tablet Take 1 tablet (0.2 mg total) by mouth daily 90 tablet 1   • cyclobenzaprine (FLEXERIL) 10 mg tablet Take 1 tablet (10 mg total) by mouth 3 (three) times a day as needed for muscle spasms 30 tablet 0   • diclofenac sodium (VOLTAREN) 1 % Apply 2 g topically 4 (four) times a day     • fluticasone (FLOVENT HFA) 110  "MCG/ACT inhaler Inhale 2 puffs 2 (two) times a day Rinse mouth after use. 12 g 3   • Fluticasone-Salmeterol (Advair Diskus) 500-50 mcg/dose inhaler Inhale 1 puff 2 (two) times a day Rinse mouth after use. 60 blister 3   • gabapentin (NEURONTIN) 300 mg capsule Take 1 capsule (300 mg total) by mouth 2 (two) times a day 180 capsule 0   • hydroCHLOROthiazide 25 mg tablet Take 2 tablets (50 mg total) by mouth daily 60 tablet 3   • ipratropium-albuterol (DUO-NEB) 0.5-2.5 mg/3 mL nebulizer solution Take 3 mL by nebulization 4 (four) times a day 180 mL 1   • losartan (COZAAR) 100 MG tablet Take 1 tablet (100 mg total) by mouth daily 90 tablet 1   • megestrol (MEGACE) 40 mg tablet Take 1 tablet (40 mg total) by mouth daily 90 tablet 1   • methylPREDNISolone 4 MG tablet therapy pack Use as directed on package 21 each 0   • mometasone (NASONEX) 50 mcg/act nasal spray 2 sprays into each nostril daily 3 g 1   • omeprazole (PriLOSEC) 20 mg delayed release capsule Take 1 capsule (20 mg total) by mouth 2 (two) times a day 180 capsule 0   • potassium chloride (Klor-Con M10) 10 mEq tablet Take 1 tablet (10 mEq total) by mouth daily 90 tablet 1   • simvastatin (ZOCOR) 20 mg tablet Take 1 tablet (20 mg total) by mouth daily at bedtime 90 tablet 1   • tadalafil (CIALIS) 20 MG tablet Take 1 tablet (20 mg total) by mouth daily as needed for erectile dysfunction 10 tablet 5   • triamcinolone (KENALOG) 0.1 % lotion Apply topically 3 (three) times a day 60 mL 1   • varenicline (CHANTIX) 1 mg tablet Take 1 mg by mouth 2 (two) times a day Takes as needed     • zafirlukast (ACCOLATE) 20 MG tablet Take 1 tablet (20 mg total) by mouth 2 (two) times a day 60 tablet 3     No current facility-administered medications for this visit.       Review of Systems      Objective:    BP (!) 182/98 (BP Location: Right arm, Patient Position: Sitting, Cuff Size: Standard)   Pulse 71   Resp 16   Ht 6' 3\" (1.905 m)   Wt 80 kg (176 lb 6.4 oz)   SpO2 98%   " BMI 22.05 kg/m²   Body mass index is 22.05 kg/m².     Physical Exam

## 2024-08-05 ENCOUNTER — RA CDI HCC (OUTPATIENT)
Dept: OTHER | Facility: HOSPITAL | Age: 74
End: 2024-08-05

## 2024-08-06 LAB — HBA1C MFR BLD HPLC: 5.2 %

## 2024-08-09 ENCOUNTER — OFFICE VISIT (OUTPATIENT)
Dept: FAMILY MEDICINE CLINIC | Facility: CLINIC | Age: 74
End: 2024-08-09
Payer: COMMERCIAL

## 2024-08-09 VITALS
OXYGEN SATURATION: 96 % | TEMPERATURE: 97.4 F | HEIGHT: 75 IN | HEART RATE: 87 BPM | BODY MASS INDEX: 22.38 KG/M2 | SYSTOLIC BLOOD PRESSURE: 140 MMHG | DIASTOLIC BLOOD PRESSURE: 98 MMHG | WEIGHT: 180 LBS

## 2024-08-09 DIAGNOSIS — J44.9 CHRONIC OBSTRUCTIVE PULMONARY DISEASE, UNSPECIFIED COPD TYPE (HCC): ICD-10-CM

## 2024-08-09 DIAGNOSIS — N25.81 SECONDARY HYPERPARATHYROIDISM (HCC): ICD-10-CM

## 2024-08-09 DIAGNOSIS — J45.40 MODERATE PERSISTENT ASTHMA WITHOUT COMPLICATION: ICD-10-CM

## 2024-08-09 DIAGNOSIS — I10 UNCONTROLLED HYPERTENSION: ICD-10-CM

## 2024-08-09 DIAGNOSIS — J40 BRONCHITIS: ICD-10-CM

## 2024-08-09 DIAGNOSIS — R03.0 ELEVATED BLOOD PRESSURE READING: Primary | ICD-10-CM

## 2024-08-09 DIAGNOSIS — H61.20 CERUMEN IN AUDITORY CANAL ON EXAMINATION: ICD-10-CM

## 2024-08-09 PROCEDURE — G2211 COMPLEX E/M VISIT ADD ON: HCPCS | Performed by: FAMILY MEDICINE

## 2024-08-09 PROCEDURE — 99214 OFFICE O/P EST MOD 30 MIN: CPT | Performed by: FAMILY MEDICINE

## 2024-08-09 RX ORDER — DEXAMETHASONE 1 MG
TABLET ORAL
COMMUNITY
Start: 2024-08-05

## 2024-08-09 RX ORDER — DEXTROMETHORPHAN HYDROBROMIDE AND PROMETHAZINE HYDROCHLORIDE 15; 6.25 MG/5ML; MG/5ML
5 SYRUP ORAL 4 TIMES DAILY PRN
Qty: 180 ML | Refills: 0 | Status: SHIPPED | OUTPATIENT
Start: 2024-08-09

## 2024-08-09 RX ORDER — AZITHROMYCIN 250 MG/1
TABLET, FILM COATED ORAL
Qty: 6 TABLET | Refills: 0 | Status: SHIPPED | OUTPATIENT
Start: 2024-08-09 | End: 2024-08-14

## 2024-08-09 RX ORDER — TERAZOSIN 1 MG/1
1 CAPSULE ORAL
Qty: 90 CAPSULE | Refills: 1 | Status: SHIPPED | OUTPATIENT
Start: 2024-08-09

## 2024-08-09 NOTE — PROGRESS NOTES
Assessment/Plan: Uncontrolled hypertension with a blood pressure reading 140/98 we will add Hytrin to his regimen and refer him to nephrology    Acute bronchitis superimposed on COPD with an asthmatic component we will provide Zithromax and Promethazine DM        Problem List Items Addressed This Visit     Asthma    COPD (chronic obstructive pulmonary disease) (HCC)    Relevant Medications    dexamethasone (DECADRON) 1 mg tablet    promethazine-dextromethorphan (PHENERGAN-DM) 6.25-15 mg/5 mL oral syrup    Secondary hyperparathyroidism (HCC)   Other Visit Diagnoses     Elevated blood pressure reading    -  Primary    Relevant Medications    terazosin (HYTRIN) 1 mg capsule    Other Relevant Orders    Ambulatory Referral to Nephrology    Bronchitis        Relevant Medications    azithromycin (Zithromax) 250 mg tablet    promethazine-dextromethorphan (PHENERGAN-DM) 6.25-15 mg/5 mL oral syrup    Cerumen in auditory canal on examination        Relevant Medications    carbamide peroxide (DEBROX) 6.5 % otic solution    Uncontrolled hypertension        Relevant Medications    terazosin (HYTRIN) 1 mg capsule    Other Relevant Orders    Ambulatory Referral to Nephrology           Diagnoses and all orders for this visit:    Elevated blood pressure reading  -     terazosin (HYTRIN) 1 mg capsule; Take 1 capsule (1 mg total) by mouth daily at bedtime  -     Ambulatory Referral to Nephrology; Future    Bronchitis  -     azithromycin (Zithromax) 250 mg tablet; Take 2 tablets (500 mg total) by mouth daily for 1 day, THEN 1 tablet (250 mg total) daily for 4 days.  -     promethazine-dextromethorphan (PHENERGAN-DM) 6.25-15 mg/5 mL oral syrup; Take 5 mL by mouth 4 (four) times a day as needed for cough    Cerumen in auditory canal on examination  -     carbamide peroxide (DEBROX) 6.5 % otic solution; Administer 5 drops into both ears 2 (two) times a day    Secondary hyperparathyroidism (HCC)    Moderate persistent asthma without  complication    Chronic obstructive pulmonary disease, unspecified COPD type (HCC)    Uncontrolled hypertension  -     Ambulatory Referral to Nephrology; Future    Other orders  -     dexamethasone (DECADRON) 1 mg tablet; TAKE 1 TABLET BY MOUTH AS DIRECTED AT 11PM THE EVENING BEFORE YOUR 8AM BLOOD WORK THE NEXT DAY        No problem-specific Assessment & Plan notes found for this encounter.      PHQ-2/9 Depression Screening            Body mass index is 22.5 kg/m².    BMI Counseling: Body mass index is 22.5 kg/m². The BMI   Subjective:      Patient ID: Yovani Sánchez is a 73 y.o. male.    Patient presents for 2-week checkup on hypertension and COPD with asthma component.  The patient has a chief complaint of cough productive of yellowish phlegm.  Patient also complains of cerumen in the ears    Cough  Pertinent negatives include no chest pain, chills, ear pain, fever, rash, sore throat or shortness of breath.       The following portions of the patient's history were reviewed and updated as appropriate:   He has a past medical history of Asthma, COPD (chronic obstructive pulmonary disease) (HCC), Diabetes mellitus (HCC), Hyperlipidemia, and Hypertension.,  does not have any pertinent problems on file.,   has a past surgical history that includes Total hip arthroplasty (Right) and SHOULDER ARTHROPLASTY (Right).,  family history includes Breast cancer in his mother; Hypertension in his brother.,   reports that he has been smoking cigars and cigarettes. He started smoking about 60 years ago. He has a 15.2 pack-year smoking history. He has never used smokeless tobacco. He reports current alcohol use. He reports that he does not use drugs.,  is allergic to shrimp extract allergy skin test - food allergy..  Current Outpatient Medications   Medication Sig Dispense Refill   • albuterol (PROVENTIL HFA,VENTOLIN HFA) 90 mcg/act inhaler Inhale 2 puffs 4 (four) times a day 18 g 3   • amLODIPine (NORVASC) 10 mg tablet Take 1 tablet  (10 mg total) by mouth daily 90 tablet 1   • azithromycin (Zithromax) 250 mg tablet Take 2 tablets (500 mg total) by mouth daily for 1 day, THEN 1 tablet (250 mg total) daily for 4 days. 6 tablet 0   • buPROPion (WELLBUTRIN SR) 150 mg 12 hr tablet Take 1 tablet (150 mg total) by mouth 2 (two) times a day 180 tablet 0   • carbamide peroxide (DEBROX) 6.5 % otic solution Administer 5 drops into both ears 2 (two) times a day 15 mL 2   • cloNIDine (CATAPRES) 0.2 mg tablet Take 1 tablet (0.2 mg total) by mouth daily 90 tablet 1   • cyclobenzaprine (FLEXERIL) 10 mg tablet Take 1 tablet (10 mg total) by mouth 3 (three) times a day as needed for muscle spasms 30 tablet 0   • dexamethasone (DECADRON) 1 mg tablet TAKE 1 TABLET BY MOUTH AS DIRECTED AT 11PM THE EVENING BEFORE YOUR 8AM BLOOD WORK THE NEXT DAY     • diclofenac sodium (VOLTAREN) 1 % Apply 2 g topically 4 (four) times a day     • Fluticasone-Salmeterol (Advair Diskus) 500-50 mcg/dose inhaler Inhale 1 puff 2 (two) times a day Rinse mouth after use. 60 blister 3   • gabapentin (NEURONTIN) 300 mg capsule Take 1 capsule (300 mg total) by mouth 2 (two) times a day 180 capsule 0   • hydroCHLOROthiazide 25 mg tablet Take 2 tablets (50 mg total) by mouth daily 60 tablet 3   • ipratropium-albuterol (DUO-NEB) 0.5-2.5 mg/3 mL nebulizer solution Take 3 mL by nebulization 4 (four) times a day 180 mL 1   • losartan (COZAAR) 100 MG tablet Take 1 tablet (100 mg total) by mouth daily 90 tablet 1   • megestrol (MEGACE) 40 mg tablet Take 1 tablet (40 mg total) by mouth daily 90 tablet 1   • mometasone (NASONEX) 50 mcg/act nasal spray 2 sprays into each nostril daily 3 g 1   • omeprazole (PriLOSEC) 20 mg delayed release capsule Take 1 capsule (20 mg total) by mouth 2 (two) times a day 180 capsule 0   • potassium chloride (Klor-Con M10) 10 mEq tablet Take 1 tablet (10 mEq total) by mouth daily 90 tablet 1   • promethazine-dextromethorphan (PHENERGAN-DM) 6.25-15 mg/5 mL oral syrup Take 5  "mL by mouth 4 (four) times a day as needed for cough 180 mL 0   • simvastatin (ZOCOR) 20 mg tablet Take 1 tablet (20 mg total) by mouth daily at bedtime 90 tablet 1   • terazosin (HYTRIN) 1 mg capsule Take 1 capsule (1 mg total) by mouth daily at bedtime 90 capsule 1   • triamcinolone (KENALOG) 0.1 % lotion Apply topically 3 (three) times a day 60 mL 1   • zafirlukast (ACCOLATE) 20 MG tablet Take 1 tablet (20 mg total) by mouth 2 (two) times a day 60 tablet 3     No current facility-administered medications for this visit.       Review of Systems   Constitutional:  Negative for chills and fever.   HENT:  Positive for ear discharge. Negative for ear pain and sore throat.    Eyes:  Negative for pain and visual disturbance.   Respiratory:  Positive for cough. Negative for shortness of breath.    Cardiovascular:  Negative for chest pain and palpitations.   Gastrointestinal:  Negative for abdominal pain and vomiting.   Genitourinary:  Negative for dysuria and hematuria.   Musculoskeletal:  Negative for arthralgias and back pain.   Skin:  Negative for color change and rash.   Neurological:  Negative for seizures and syncope.   All other systems reviewed and are negative.        Objective:    /98 (BP Location: Left arm, Patient Position: Sitting)   Pulse 87   Temp (!) 97.4 °F (36.3 °C) (Tympanic)   Ht 6' 3\" (1.905 m)   Wt 81.6 kg (180 lb)   SpO2 96%   BMI 22.50 kg/m²   Body mass index is 22.5 kg/m².     Physical Exam  Constitutional:       Appearance: Normal appearance. He is well-developed.   HENT:      Head: Normocephalic and atraumatic.      Right Ear: Hearing, tympanic membrane, ear canal and external ear normal.      Left Ear: Hearing, tympanic membrane, ear canal and external ear normal.      Nose: Nose normal.      Mouth/Throat:      Mouth: Mucous membranes are moist.      Pharynx: Oropharynx is clear.   Eyes:      Extraocular Movements: Extraocular movements intact.      Conjunctiva/sclera: Conjunctivae " normal.      Pupils: Pupils are equal, round, and reactive to light.   Cardiovascular:      Rate and Rhythm: Normal rate and regular rhythm.      Pulses: Normal pulses.      Heart sounds: Normal heart sounds.   Pulmonary:      Effort: Pulmonary effort is normal.      Breath sounds: Normal breath sounds.   Abdominal:      General: Abdomen is flat. Bowel sounds are normal.      Palpations: Abdomen is soft.      Tenderness: There is no abdominal tenderness.   Musculoskeletal:         General: Normal range of motion.      Cervical back: Normal range of motion and neck supple.   Skin:     General: Skin is warm and dry.      Capillary Refill: Capillary refill takes less than 2 seconds.   Neurological:      General: No focal deficit present.      Mental Status: He is alert and oriented to person, place, and time.   Psychiatric:         Mood and Affect: Mood normal.         Behavior: Behavior normal.         Thought Content: Thought content normal.         Judgment: Judgment normal.

## 2024-09-10 ENCOUNTER — RA CDI HCC (OUTPATIENT)
Dept: OTHER | Facility: HOSPITAL | Age: 74
End: 2024-09-10

## 2024-09-18 ENCOUNTER — TELEPHONE (OUTPATIENT)
Age: 74
End: 2024-09-18

## 2024-09-18 NOTE — TELEPHONE ENCOUNTER
Spoke to patient;   Patient lives in [place] and provided patient with the lab information for the [facility]. Lab hours Monday -Friday, 7am-12pm

## 2024-09-18 NOTE — TELEPHONE ENCOUNTER
Pt called inquiring if there was any other lab that he can go to?    Pt reports that he has been having a hard time getting scheduled with [facility], no one ever picks up the phone.     Please advise & call pt back with update on his inquiry. Thank you!    Yovani Sánchez   [phone]

## 2024-09-20 ENCOUNTER — APPOINTMENT (OUTPATIENT)
Dept: LAB | Facility: CLINIC | Age: 74
End: 2024-09-20
Payer: COMMERCIAL

## 2024-09-20 DIAGNOSIS — E78.5 DYSLIPIDEMIA: ICD-10-CM

## 2024-09-20 DIAGNOSIS — R73.03 PREDIABETES: ICD-10-CM

## 2024-09-20 DIAGNOSIS — E55.9 VITAMIN D DEFICIENCY: ICD-10-CM

## 2024-09-20 DIAGNOSIS — J45.40 MODERATE PERSISTENT ASTHMA WITHOUT COMPLICATION: ICD-10-CM

## 2024-09-20 LAB
25(OH)D3 SERPL-MCNC: 9.8 NG/ML (ref 30–100)
ALBUMIN SERPL BCG-MCNC: 4.4 G/DL (ref 3.5–5)
ALP SERPL-CCNC: 62 U/L (ref 34–104)
ALT SERPL W P-5'-P-CCNC: 13 U/L (ref 7–52)
ANION GAP SERPL CALCULATED.3IONS-SCNC: 13 MMOL/L (ref 4–13)
AST SERPL W P-5'-P-CCNC: 19 U/L (ref 13–39)
BASOPHILS # BLD AUTO: 0.04 THOUSANDS/ΜL (ref 0–0.1)
BASOPHILS NFR BLD AUTO: 1 % (ref 0–1)
BILIRUB SERPL-MCNC: 0.97 MG/DL (ref 0.2–1)
BUN SERPL-MCNC: 20 MG/DL (ref 5–25)
CALCIUM SERPL-MCNC: 8.7 MG/DL (ref 8.4–10.2)
CHLORIDE SERPL-SCNC: 108 MMOL/L (ref 96–108)
CHOLEST SERPL-MCNC: 176 MG/DL
CO2 SERPL-SCNC: 22 MMOL/L (ref 21–32)
CREAT SERPL-MCNC: 1.53 MG/DL (ref 0.6–1.3)
EOSINOPHIL # BLD AUTO: 0.23 THOUSAND/ΜL (ref 0–0.61)
EOSINOPHIL NFR BLD AUTO: 4 % (ref 0–6)
ERYTHROCYTE [DISTWIDTH] IN BLOOD BY AUTOMATED COUNT: 14.5 % (ref 11.6–15.1)
GFR SERPL CREATININE-BSD FRML MDRD: 44 ML/MIN/1.73SQ M
GLUCOSE P FAST SERPL-MCNC: 78 MG/DL (ref 65–99)
HCT VFR BLD AUTO: 34.8 % (ref 36.5–49.3)
HDLC SERPL-MCNC: 90 MG/DL
HGB BLD-MCNC: 11.5 G/DL (ref 12–17)
IMM GRANULOCYTES # BLD AUTO: 0.01 THOUSAND/UL (ref 0–0.2)
IMM GRANULOCYTES NFR BLD AUTO: 0 % (ref 0–2)
LDLC SERPL CALC-MCNC: 70 MG/DL (ref 0–100)
LYMPHOCYTES # BLD AUTO: 1.68 THOUSANDS/ΜL (ref 0.6–4.47)
LYMPHOCYTES NFR BLD AUTO: 29 % (ref 14–44)
MCH RBC QN AUTO: 27.4 PG (ref 26.8–34.3)
MCHC RBC AUTO-ENTMCNC: 33 G/DL (ref 31.4–37.4)
MCV RBC AUTO: 83 FL (ref 82–98)
MONOCYTES # BLD AUTO: 0.43 THOUSAND/ΜL (ref 0.17–1.22)
MONOCYTES NFR BLD AUTO: 7 % (ref 4–12)
NEUTROPHILS # BLD AUTO: 3.42 THOUSANDS/ΜL (ref 1.85–7.62)
NEUTS SEG NFR BLD AUTO: 59 % (ref 43–75)
NRBC BLD AUTO-RTO: 0 /100 WBCS
PLATELET # BLD AUTO: 104 THOUSANDS/UL (ref 149–390)
PMV BLD AUTO: 10.5 FL (ref 8.9–12.7)
POTASSIUM SERPL-SCNC: 3.2 MMOL/L (ref 3.5–5.3)
PROT SERPL-MCNC: 6.9 G/DL (ref 6.4–8.4)
RBC # BLD AUTO: 4.19 MILLION/UL (ref 3.88–5.62)
SODIUM SERPL-SCNC: 143 MMOL/L (ref 135–147)
TRIGL SERPL-MCNC: 81 MG/DL
WBC # BLD AUTO: 5.81 THOUSAND/UL (ref 4.31–10.16)

## 2024-09-20 PROCEDURE — 80061 LIPID PANEL: CPT

## 2024-09-20 PROCEDURE — 85025 COMPLETE CBC W/AUTO DIFF WBC: CPT

## 2024-09-20 PROCEDURE — 82306 VITAMIN D 25 HYDROXY: CPT

## 2024-09-20 PROCEDURE — 80053 COMPREHEN METABOLIC PANEL: CPT

## 2024-09-20 PROCEDURE — 83036 HEMOGLOBIN GLYCOSYLATED A1C: CPT

## 2024-09-21 LAB
EST. AVERAGE GLUCOSE BLD GHB EST-MCNC: 114 MG/DL
HBA1C MFR BLD: 5.6 %

## 2024-09-23 ENCOUNTER — APPOINTMENT (OUTPATIENT)
Dept: LAB | Facility: CLINIC | Age: 74
End: 2024-09-23
Payer: COMMERCIAL

## 2024-09-23 ENCOUNTER — TELEPHONE (OUTPATIENT)
Age: 74
End: 2024-09-23

## 2024-09-23 DIAGNOSIS — I15.2 ADRENAL HYPERTENSION (HCC): ICD-10-CM

## 2024-09-23 DIAGNOSIS — E27.9 ADRENAL HYPERTENSION (HCC): ICD-10-CM

## 2024-09-23 DIAGNOSIS — E04.2 NONTOXIC MULTINODULAR GOITER: ICD-10-CM

## 2024-09-23 NOTE — TELEPHONE ENCOUNTER
Ultrasound ordered by [facility].     Patient cancelled 09/06/2024 OV, and no showed 09/17/2024 OV with PCP.

## 2024-09-24 DIAGNOSIS — E87.6 HYPOKALEMIA: Primary | ICD-10-CM

## 2024-09-30 ENCOUNTER — TELEPHONE (OUTPATIENT)
Dept: NEPHROLOGY | Facility: CLINIC | Age: 74
End: 2024-09-30

## 2024-09-30 NOTE — TELEPHONE ENCOUNTER
I called and spoke with Yovani regarding completing urine and blood studies prior to his upcoming appt on 10/18/2024.

## 2024-10-15 DIAGNOSIS — J44.9 CHRONIC OBSTRUCTIVE PULMONARY DISEASE, UNSPECIFIED COPD TYPE (HCC): ICD-10-CM

## 2024-10-15 RX ORDER — IPRATROPIUM BROMIDE AND ALBUTEROL SULFATE 2.5; .5 MG/3ML; MG/3ML
3 SOLUTION RESPIRATORY (INHALATION) 4 TIMES DAILY
Qty: 360 ML | Refills: 5 | Status: SHIPPED | OUTPATIENT
Start: 2024-10-15

## 2024-10-15 NOTE — TELEPHONE ENCOUNTER
Reason for call:   [x] Refill   [] Prior Auth  [] Other:     Office:   [x] PCP/Provider - Yovani Escobar DO /[facility]   [] Specialty/Provider -     Medication:     ipratropium-albuterol (DUO-NEB) 0.5-2.5 mg/3 mL nebulizer solution       Dose/Frequency: Take 3 mL by nebulization 4 (four) times a day,     Quantity: 360    Pharmacy: [facility]/pharmacy #[id number] - [place] - [address] - [place]     Does the patient have enough for 3 days?   [] Yes   [x] No - Send as HP to POD

## 2024-10-18 ENCOUNTER — CONSULT (OUTPATIENT)
Dept: NEPHROLOGY | Facility: CLINIC | Age: 74
End: 2024-10-18
Payer: COMMERCIAL

## 2024-10-18 VITALS
WEIGHT: 184 LBS | BODY MASS INDEX: 22.88 KG/M2 | DIASTOLIC BLOOD PRESSURE: 84 MMHG | SYSTOLIC BLOOD PRESSURE: 150 MMHG | HEIGHT: 75 IN | TEMPERATURE: 97.8 F | OXYGEN SATURATION: 99 % | HEART RATE: 66 BPM

## 2024-10-18 DIAGNOSIS — R03.0 ELEVATED BLOOD PRESSURE READING: ICD-10-CM

## 2024-10-18 DIAGNOSIS — I10 UNCONTROLLED HYPERTENSION: ICD-10-CM

## 2024-10-18 DIAGNOSIS — N18.32 STAGE 3B CHRONIC KIDNEY DISEASE (HCC): ICD-10-CM

## 2024-10-18 DIAGNOSIS — Q61.02 MULTIPLE RENAL CYSTS: Primary | ICD-10-CM

## 2024-10-18 DIAGNOSIS — N18.32 TYPE 2 DIABETES MELLITUS WITH STAGE 3B CHRONIC KIDNEY DISEASE, UNSPECIFIED WHETHER LONG TERM INSULIN USE (HCC): ICD-10-CM

## 2024-10-18 DIAGNOSIS — E87.6 HYPOKALEMIA: Chronic | ICD-10-CM

## 2024-10-18 DIAGNOSIS — E11.22 TYPE 2 DIABETES MELLITUS WITH STAGE 3B CHRONIC KIDNEY DISEASE, UNSPECIFIED WHETHER LONG TERM INSULIN USE (HCC): ICD-10-CM

## 2024-10-18 PROCEDURE — 99214 OFFICE O/P EST MOD 30 MIN: CPT | Performed by: INTERNAL MEDICINE

## 2024-10-18 PROCEDURE — G2211 COMPLEX E/M VISIT ADD ON: HCPCS | Performed by: INTERNAL MEDICINE

## 2024-10-18 RX ORDER — TADALAFIL 20 MG/1
20 TABLET ORAL DAILY PRN
COMMUNITY

## 2024-10-18 RX ORDER — POTASSIUM CHLORIDE 750 MG/1
10 TABLET, EXTENDED RELEASE ORAL DAILY
COMMUNITY
Start: 2024-10-12

## 2024-10-18 RX ORDER — FLUTICASONE PROPIONATE 110 UG/1
2 AEROSOL, METERED RESPIRATORY (INHALATION) 2 TIMES DAILY
COMMUNITY

## 2024-10-18 RX ORDER — PREDNISONE 20 MG/1
TABLET ORAL AS NEEDED
COMMUNITY

## 2024-10-18 NOTE — PROGRESS NOTES
Assessment & Plan:    1. Multiple renal cysts  -     US kidney and bladder; Future; Expected date: 10/18/2024  2. Elevated blood pressure reading  -     Ambulatory Referral to Nephrology  3. Uncontrolled hypertension  -     Ambulatory Referral to Nephrology  4. Type 2 diabetes mellitus with stage 3b chronic kidney disease, unspecified whether long term insulin use (HCC)  5. Stage 3b chronic kidney disease (HCC)  6. Hypokalemia         Multiple renal cysts noted on previous CT. Mildly complex cyst in right kidney 9/18/24, fu renal US.    2. Elevated BP reading  BP remains suboptimally controlled, not monitoring at home. Goal<130/80.   Reached out to endocrinology to discuss secondary HTN workup.  Hyperaldosterone testing interpreted as gray zone +. Intermittent hypokalemia noted in setting of HCTZ but also concerning for primary hyperaldosteronism.   My office placed call to endocrinology to discuss spironolactone trial as opposed to further confirmatory testing.    3. Uncontrolled HTN, potential component of primary hyperaldosteronism.  Current regimen:  terazosin 1mg/day (added 8/9/24), amlodipine 10mg/day, clonidine 0.2 mg bid, HCTZ 50mg/day, and losartan 100mg/day.    Ideally add spironolactone 25mg/day to help with BP control and attempt to titrate off clonidine. Patient unhappy with side effect profile including dry mouth and fatigue.     4.Type 2 DM  Continue management per primary team. Continue Losartan and trend albuminuria q 6 months.   Consider SGLT2I addition for CKD management.    5. CKD3b  Volume status euvolemic.   BP not under optimal control, goal<130/80.  Baseline 1.6-1.9 mg/dL with elevation  January 2023 attributed to hypoperfusion.   9/20/24 Cr 1.5 with eGFR 44 ML/MIN. Metabolic stable aside from mild hypokalemia, on KCL 10meq/day.    No obstructive uropathy but mild complex cyst. Recommend repeat renal US, urology previously consulted but patient has not seen specialty.CKD presumed 2/2 HTN  nephrosclerosis and age related eGFR loss, not biopsy proven.    May consider spironolactone addition after discussion with endocrinology.    FU in 3 months with labs prior.    6. Hypokalemia,mild  Suspect due to combination of hyperaldosteronsim and HCTZ.  Currently on KCL 10meq/day.    Suspect if spirinolactone added, can DC KCL and decrease HCTZ replacement.       The benefits, risks and alternatives to the treatment plan were discussed at this visit. Patient was advised of common adverse effects of any medical therapies prescribed. All questions were answered and discussed with the patient and any accompanying family members or caretakers.      Subjective:      Patient ID: Yovani Sánchez is a 74 y.o. male presenting for consultation in the [place] office for CKD management. Patient referred by PCP for 2nd opinion on CKD management. Previously under the care of Dr Betancourt for CKD management and was recently seen on 6/18/24. Patient is a 73 yo  male with past medical history of HTN for 25 years, COPD, tobacco use, GERD, OA and diabetes. Creatinine baseline 1.7-1.9 mg/dL. Cr had risen to 2.2 January 2023 attributed to hypoperfusion from lower BP. Abdominal US without evidence of obstruction. CKD 2/2 HTN nephrosclerosis and age related eGFR loss. At last visit, clonidine increased to 0.2 mg BID.    HPI  Current HTN regimen, terazosin 1mg/day (added 8/9/24), amlodipine 10mg/day, clonidine 0.2 mg bid, HCTZ 50mg/day, and losartan 100mg/day. On 9/20 had a secondary HTN workup by endocrinologist with concern for adrenal gland  nodular thickening. Of note, intermittent mild hypokalemia noted. Regan/renin ratio elevated at 35.2 and renin 0.3 ( lower despite ARB/HCTZ) and aldosterone >10 at 10.8. TSH WNL. Unclear if patient took 1 mg dexamethasone prior to testing but Dexamethasone normal and cortisol 13.5, not suppressed. DHEA low at 18.5.  9/20/24 Cr 1.5 with eGFR 44 ml/min.    Denies hematuria/ foamy urine  "/dysuira. Reports UTI in the past but does not have any symptoms at present.    Denies hx cancer, autoimmune disease.    Takes BG at home and is under 100. Reports DM x 7. Denies follow up with optometry 1 year. Denies retinopathy.   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     Currently a smoker. Occasionally alcohol use.    Denies TB/HIV/hepatiits.     Reports weigh loss, down to 180. Appetite poor. This has been a progression over time. Reports following with GI team. Ct scan 9/18/23 showed gastric thickening and malignancy a concern.       The following portions of the patient's history were reviewed and updated as appropriate: allergies, current medications, past family history, past medical history, past social history, past surgical history, and problem list.    Review of Systems   Respiratory: Negative.     Cardiovascular: Negative.    Gastrointestinal: Negative.    Genitourinary:  Positive for frequency.   Neurological: Negative.    All other systems reviewed and are negative.        Objective:      /84 (BP Location: Left arm, Patient Position: Sitting, Cuff Size: Standard)   Pulse 66   Temp 97.8 °F (36.6 °C)   Ht 6' 3\" (1.905 m)   Wt 83.5 kg (184 lb)   SpO2 99%   BMI 23.00 kg/m²          Physical Exam  Vitals reviewed.   Constitutional:       General: He is not in acute distress.     Appearance: He is not ill-appearing.   HENT:      Head: Normocephalic and atraumatic.      Nose: Nose normal.      Mouth/Throat:      Mouth: Mucous membranes are moist.      Pharynx: Oropharynx is clear.   Eyes:      Extraocular Movements: Extraocular movements intact.      Conjunctiva/sclera: Conjunctivae normal.   Cardiovascular:      Rate and Rhythm: Normal rate and regular rhythm.      Heart sounds:      No friction rub.   Pulmonary:      Breath sounds: No wheezing or rales.   Abdominal:      Tenderness: There is no abdominal tenderness. There is no guarding.   Musculoskeletal:      Right lower leg: No edema.      Left " "lower leg: No edema.   Skin:     Coloration: Skin is not jaundiced.      Findings: No bruising.   Neurological:      Mental Status: He is alert. Mental status is at baseline.             Lab Results   Component Value Date    SODIUM 143 09/20/2024    K 3.2 (L) 09/20/2024     09/20/2024    CO2 22 09/20/2024    AGAP 13 09/20/2024    BUN 20 09/20/2024    CREATININE 1.53 (H) 09/20/2024    GLUC 86 12/11/2023    GLUF 78 09/20/2024    CALCIUM 8.7 09/20/2024    AST 19 09/20/2024    ALT 13 09/20/2024    ALKPHOS 62 09/20/2024    TP 6.9 09/20/2024    TBILI 0.97 09/20/2024    EGFR 44 09/20/2024      Lab Results   Component Value Date    CREATININE 1.53 (H) 09/20/2024    CREATININE 1.75 (H) 12/11/2023    CREATININE 1.62 (H) 09/18/2023    CREATININE 1.93 (H) 06/19/2023    CREATININE 2.33 (H) 05/22/2023    CREATININE 1.85 (H) 03/13/2023    CREATININE 2.17 (H) 01/13/2023    CREATININE 1.91 (H) 01/26/2022    CREATININE 1.78 (H) 11/05/2021    CREATININE 1.84 (H) 11/02/2020    CREATININE 1.48 (H) 01/19/2020    CREATININE 1.77 (H) 01/18/2020    CREATININE 1.71 (H) 01/03/2020    CREATININE 1.70 (H) 09/30/2019    CREATININE 1.83 (H) 02/18/2019      Lab Results   Component Value Date    COLORU Yellow 09/18/2023    CLARITYU Clear 09/18/2023    SPECGRAV 1.015 09/18/2023    PHUR 7.0 09/18/2023    LEUKOCYTESUR Negative 09/18/2023    NITRITE Negative 09/18/2023    PROTEIN UA Negative 09/18/2023    GLUCOSEU Negative 09/18/2023    KETONESU Negative 09/18/2023    UROBILINOGEN 0.2 09/18/2023    BILIRUBINUR Negative 09/18/2023    BLOODU Negative 09/18/2023      No results found for: \"LABPROT\"  No results found for: \"MICROALBUR\", \"XHFN25EUG\"  Lab Results   Component Value Date    WBC 5.81 09/20/2024    HGB 11.5 (L) 09/20/2024    HCT 34.8 (L) 09/20/2024    MCV 83 09/20/2024     (L) 09/20/2024      Lab Results   Component Value Date    HGB 11.5 (L) 09/20/2024    HGB 12.7 09/18/2023    HGB 13.3 05/22/2023    HGB 11.5 (L) 01/19/2020    HGB " "11.7 (L) 01/18/2020      No results found for: \"IRON\", \"TIBC\", \"FERRITIN\"   No results found for: \"PTHCALCIUM\", \"[id number]\", \"PHOSPHORUS\"   Lab Results   Component Value Date    CHOLESTEROL 176 09/20/2024    HDL 90 09/20/2024    LDLCALC 70 09/20/2024    TRIG 81 09/20/2024      No results found for: \"URICACID\"   Lab Results   Component Value Date    HGBA1C 5.6 09/20/2024      Lab Results   Component Value Date    FREET4 1.13 02/18/2019      No results found for: \"BETTINA\", \"DSDNAAB\", \"RFIGM\"   No results found for: \"PROT\", \"UPEP\", \"IMMUNOFIX\", \"KAPPALAMBDA\", \"KAPPALIGHT\"     Portions of the record may have been created with voice recognition software. Occasional wrong word or \"sound a like\" substitutions may have occurred due to the inherent limitations of voice recognition software. Read the chart carefully and recognize, using context, where substitutions have occurred. If you have any questions, please contact the dictating provider.      "

## 2024-10-25 ENCOUNTER — HOSPITAL ENCOUNTER (OUTPATIENT)
Dept: ULTRASOUND IMAGING | Facility: HOSPITAL | Age: 74
Discharge: HOME | End: 2024-10-25
Attending: INTERNAL MEDICINE
Payer: COMMERCIAL

## 2024-10-25 DIAGNOSIS — Q61.02 MULTIPLE RENAL CYSTS: ICD-10-CM

## 2024-10-25 PROCEDURE — 76775 US EXAM ABDO BACK WALL LIM: CPT

## 2024-11-01 ENCOUNTER — TELEPHONE (OUTPATIENT)
Dept: NEPHROLOGY | Facility: CLINIC | Age: 74
End: 2024-11-01

## 2024-11-01 NOTE — TELEPHONE ENCOUNTER
I called and left a detailed VM for Yovani regarding the following:      ----- Message from Ashley Carney DO sent at 11/1/2024  9:44 AM EDT -----  Please call patient regarding kidney ultrasound, it shows simple kidney cysts which are nonconcerning and common as we age. No concerning masses. Kidney cysts were similar to previous CT from 2023.     I requested a call back if he has any questions or concerns.

## 2024-12-09 DIAGNOSIS — J45.40 MODERATE PERSISTENT ASTHMA WITHOUT COMPLICATION: Primary | ICD-10-CM

## 2024-12-09 RX ORDER — FLUTICASONE PROPIONATE AND SALMETEROL 500; 50 UG/1; UG/1
1 POWDER RESPIRATORY (INHALATION) 2 TIMES DAILY
Qty: 180 BLISTER | Refills: 1 | Status: SHIPPED | OUTPATIENT
Start: 2024-12-09

## 2025-01-13 ENCOUNTER — APPOINTMENT (OUTPATIENT)
Dept: LAB | Facility: CLINIC | Age: 75
End: 2025-01-13
Payer: COMMERCIAL

## 2025-01-13 DIAGNOSIS — N18.32 STAGE 3B CHRONIC KIDNEY DISEASE (CKD) (HCC): ICD-10-CM

## 2025-01-13 DIAGNOSIS — E87.6 HYPOKALEMIA: ICD-10-CM

## 2025-01-13 LAB
ALBUMIN SERPL BCG-MCNC: 4.1 G/DL (ref 3.5–5)
ANION GAP SERPL CALCULATED.3IONS-SCNC: 9 MMOL/L (ref 4–13)
BACTERIA UR QL AUTO: ABNORMAL /HPF
BILIRUB UR QL STRIP: NEGATIVE
BUN SERPL-MCNC: 28 MG/DL (ref 5–25)
CALCIUM SERPL-MCNC: 8.8 MG/DL (ref 8.4–10.2)
CHLORIDE SERPL-SCNC: 107 MMOL/L (ref 96–108)
CLARITY UR: CLEAR
CO2 SERPL-SCNC: 27 MMOL/L (ref 21–32)
COLOR UR: ABNORMAL
CREAT SERPL-MCNC: 1.86 MG/DL (ref 0.6–1.3)
CREAT UR-MCNC: 113.5 MG/DL
CREAT UR-MCNC: 113.5 MG/DL
GFR SERPL CREATININE-BSD FRML MDRD: 35 ML/MIN/1.73SQ M
GLUCOSE P FAST SERPL-MCNC: 92 MG/DL (ref 65–99)
GLUCOSE UR STRIP-MCNC: NEGATIVE MG/DL
HCT VFR BLD AUTO: 36.2 % (ref 36.5–49.3)
HGB BLD-MCNC: 11.9 G/DL (ref 12–17)
HGB UR QL STRIP.AUTO: NEGATIVE
KETONES UR STRIP-MCNC: NEGATIVE MG/DL
LEUKOCYTE ESTERASE UR QL STRIP: NEGATIVE
MAGNESIUM SERPL-MCNC: 1.8 MG/DL (ref 1.9–2.7)
MICROALBUMIN UR-MCNC: 44.7 MG/L
MICROALBUMIN/CREAT 24H UR: 39 MG/G CREATININE (ref 0–30)
NITRITE UR QL STRIP: NEGATIVE
NON-SQ EPI CELLS URNS QL MICRO: ABNORMAL /HPF
PH UR STRIP.AUTO: 6 [PH]
PHOSPHATE SERPL-MCNC: 4 MG/DL (ref 2.3–4.1)
POTASSIUM SERPL-SCNC: 3.8 MMOL/L (ref 3.5–5.3)
PROT UR STRIP-MCNC: ABNORMAL MG/DL
PROT UR-MCNC: 15.2 MG/DL
PROT/CREAT UR: 0.1 MG/G{CREAT} (ref 0–0.1)
PTH-INTACT SERPL-MCNC: 195.4 PG/ML (ref 12–88)
RBC #/AREA URNS AUTO: ABNORMAL /HPF
SODIUM SERPL-SCNC: 143 MMOL/L (ref 135–147)
SP GR UR STRIP.AUTO: 1.01 (ref 1–1.03)
UROBILINOGEN UR STRIP-ACNC: <2 MG/DL
WBC #/AREA URNS AUTO: ABNORMAL /HPF

## 2025-01-13 PROCEDURE — 80069 RENAL FUNCTION PANEL: CPT

## 2025-01-13 PROCEDURE — 85018 HEMOGLOBIN: CPT

## 2025-01-13 PROCEDURE — 82570 ASSAY OF URINE CREATININE: CPT

## 2025-01-13 PROCEDURE — 83735 ASSAY OF MAGNESIUM: CPT

## 2025-01-13 PROCEDURE — 85014 HEMATOCRIT: CPT

## 2025-01-13 PROCEDURE — 83970 ASSAY OF PARATHORMONE: CPT

## 2025-01-13 PROCEDURE — 84156 ASSAY OF PROTEIN URINE: CPT

## 2025-01-13 PROCEDURE — 82043 UR ALBUMIN QUANTITATIVE: CPT

## 2025-01-13 PROCEDURE — 81001 URINALYSIS AUTO W/SCOPE: CPT

## 2025-01-13 PROCEDURE — 36415 COLL VENOUS BLD VENIPUNCTURE: CPT

## 2025-01-24 ENCOUNTER — OFFICE VISIT (OUTPATIENT)
Dept: NEPHROLOGY | Facility: CLINIC | Age: 75
End: 2025-01-24
Payer: COMMERCIAL

## 2025-01-24 VITALS
SYSTOLIC BLOOD PRESSURE: 176 MMHG | OXYGEN SATURATION: 99 % | WEIGHT: 186 LBS | HEART RATE: 73 BPM | HEIGHT: 76 IN | TEMPERATURE: 97.8 F | DIASTOLIC BLOOD PRESSURE: 96 MMHG | BODY MASS INDEX: 22.65 KG/M2

## 2025-01-24 DIAGNOSIS — E26.9 HYPERALDOSTERONISM (HCC): Primary | ICD-10-CM

## 2025-01-24 DIAGNOSIS — N18.32 STAGE 3B CHRONIC KIDNEY DISEASE (HCC): ICD-10-CM

## 2025-01-24 DIAGNOSIS — E11.22 TYPE 2 DIABETES MELLITUS WITH STAGE 3B CHRONIC KIDNEY DISEASE, UNSPECIFIED WHETHER LONG TERM INSULIN USE (HCC): ICD-10-CM

## 2025-01-24 DIAGNOSIS — N18.32 TYPE 2 DIABETES MELLITUS WITH STAGE 3B CHRONIC KIDNEY DISEASE, UNSPECIFIED WHETHER LONG TERM INSULIN USE (HCC): ICD-10-CM

## 2025-01-24 DIAGNOSIS — I12.9 HYPERTENSIVE CHRONIC KIDNEY DISEASE WITH STAGE 1 THROUGH STAGE 4 CHRONIC KIDNEY DISEASE, OR UNSPECIFIED CHRONIC KIDNEY DISEASE: ICD-10-CM

## 2025-01-24 DIAGNOSIS — N28.1 COMPLEX RENAL CYST: ICD-10-CM

## 2025-01-24 PROCEDURE — 99214 OFFICE O/P EST MOD 30 MIN: CPT | Performed by: INTERNAL MEDICINE

## 2025-01-24 RX ORDER — SPIRONOLACTONE 25 MG/1
25 TABLET ORAL DAILY
Qty: 30 TABLET | Refills: 1 | Status: SHIPPED | OUTPATIENT
Start: 2025-01-24 | End: 2025-01-24

## 2025-01-24 RX ORDER — HYDROCHLOROTHIAZIDE 25 MG/1
25 TABLET ORAL DAILY
Qty: 180 TABLET | Refills: 1 | Status: SHIPPED | OUTPATIENT
Start: 2025-01-24

## 2025-01-24 RX ORDER — SPIRONOLACTONE 25 MG/1
25 TABLET ORAL DAILY
Qty: 30 TABLET | Refills: 1 | Status: SHIPPED | OUTPATIENT
Start: 2025-01-24

## 2025-01-24 NOTE — PROGRESS NOTES
Assessment & Plan:    1. Hyperaldosteronism (HCC)  -     hydroCHLOROthiazide 25 mg tablet; Take 1 tablet (25 mg total) by mouth daily  -     Basic metabolic panel; Future; Expected date: 01/31/2025  -     spironolactone (ALDACTONE) 25 mg tablet; Take 1 tablet (25 mg total) by mouth daily  2. Type 2 diabetes mellitus with stage 3b chronic kidney disease, unspecified whether long term insulin use (HCC)  3. Hypertensive chronic kidney disease with stage 1 through stage 4 chronic kidney disease, or unspecified chronic kidney disease  -     hydroCHLOROthiazide 25 mg tablet; Take 1 tablet (25 mg total) by mouth daily  -     Basic metabolic panel; Future; Expected date: 01/31/2025  4. Stage 3b chronic kidney disease (HCC)  5. Complex renal cyst       Hyperaldosteronism, gray zone + testing.  Renin 0.307.  Regan 10.8.  Regan/renin ratio 35.2.  Given severity of uncontrolled HTN, advise to start spironolactone 25mg/day. Reviewed option with patient as opposed to further aggressive workup and agreeable.    Needs BP check in 1 week and 2 weeks.  Fu chemi in 1 week.  Stop potassium supplementation.    2. Type 2 DM with CKD  A1C nondiabetic range 5.6 9/20/24.       3. HTN with CKD, uncontrolled    Start spironolactone 25mg/day. Decrease HCTZ from 50->25 mg/day due to excessive urination concern with addition of spironolactone.  Hopefully with addition of Spironolactone, can minimize antihypertensive.    4. CKD3b  Baseline 1.6-1.9 mg/dL.   1/13/25 Cr 1.86 with eGFR 35 ml/min.  Volume status compensated.  Metabolic parameters stable.  UPCR 0.1 gram. UACR 39mg/g cr.  Mild anemia, hgb 11.9.        5. Complex renal cyst  US 10/2024 showed simple renal cysts. Previously referred to urology by previous nephrologist.  Can repeat imaging in 6-12 months to assess uS.      The benefits, risks and alternatives to the treatment plan were discussed at this visit. Patient was advised of common adverse effects of any medical therapies prescribed.  "All questions were answered and discussed with the patient and any accompanying family members or caretakers.      Subjective:      Patient ID: Yovani Sánchez is a 74 y.o. male presents for CKD and HTN evaluation in the [place] office.    HPI  Patient has hx complex renal cyst, HTN x 25 years, CKD3b previous baseline 1.6-1.9 under care of [facility], diabetes, GERD, OA, tobacco use, COPD.    In the interim since last visit, denies any new medical conditions or surgeries.  Patient not following BP trends at home, he does not have a BP cuff. BP poorly controlled at visit, 176/96.    Reports chronic PENN and has active tobacco use.    Reports frequent urination which is bothersome.    Last renal US 10/25/24, bilateral simple renal cysts, no suspicious mass.No hydro.    The following portions of the patient's history were reviewed and updated as appropriate: allergies, current medications, past family history, past medical history, past social history, past surgical history, and problem list.    Review of Systems   Constitutional: Negative.    Respiratory:  Positive for shortness of breath. Negative for wheezing.    Cardiovascular: Negative.  Negative for leg swelling.   Gastrointestinal: Negative.    Genitourinary:  Positive for frequency.   Neurological: Negative.    All other systems reviewed and are negative.        Objective:      BP (!) 176/96 (BP Location: Left arm, Patient Position: Sitting, Cuff Size: Standard) Comment: 160/90  Pulse 73   Temp 97.8 °F (36.6 °C) (Temporal)   Ht 6' 4\" (1.93 m)   Wt 84.4 kg (186 lb)   SpO2 99%   BMI 22.64 kg/m²          Physical Exam  Vitals reviewed.   Constitutional:       General: He is not in acute distress.  HENT:      Head: Normocephalic and atraumatic.      Nose: Nose normal.      Mouth/Throat:      Mouth: Mucous membranes are moist.      Pharynx: Oropharynx is clear.   Cardiovascular:      Rate and Rhythm: Normal rate and regular rhythm.      Heart " sounds:      No friction rub.   Pulmonary:      Breath sounds: Normal breath sounds. No wheezing or rales.   Abdominal:      Tenderness: There is no abdominal tenderness. There is no guarding.   Musculoskeletal:      Right lower leg: No edema.      Left lower leg: No edema.   Skin:     Coloration: Skin is not jaundiced.      Findings: No bruising.   Neurological:      General: No focal deficit present.      Mental Status: He is alert and oriented to person, place, and time. Mental status is at baseline.      Cranial Nerves: No cranial nerve deficit.   Psychiatric:         Mood and Affect: Mood normal.         Behavior: Behavior normal.             Lab Results   Component Value Date    SODIUM 143 01/13/2025    K 3.8 01/13/2025     01/13/2025    CO2 27 01/13/2025    AGAP 9 01/13/2025    BUN 28 (H) 01/13/2025    CREATININE 1.86 (H) 01/13/2025    GLUC 86 12/11/2023    GLUF 92 01/13/2025    CALCIUM 8.8 01/13/2025    AST 19 09/20/2024    ALT 13 09/20/2024    ALKPHOS 62 09/20/2024    TP 6.9 09/20/2024    TBILI 0.97 09/20/2024    EGFR 35 01/13/2025      Lab Results   Component Value Date    CREATININE 1.86 (H) 01/13/2025    CREATININE 1.53 (H) 09/20/2024    CREATININE 1.75 (H) 12/11/2023    CREATININE 1.62 (H) 09/18/2023    CREATININE 1.93 (H) 06/19/2023    CREATININE 2.33 (H) 05/22/2023    CREATININE 1.85 (H) 03/13/2023    CREATININE 2.17 (H) 01/13/2023    CREATININE 1.91 (H) 01/26/2022    CREATININE 1.78 (H) 11/05/2021    CREATININE 1.84 (H) 11/02/2020    CREATININE 1.48 (H) 01/19/2020    CREATININE 1.77 (H) 01/18/2020    CREATININE 1.71 (H) 01/03/2020    CREATININE 1.70 (H) 09/30/2019      Lab Results   Component Value Date    COLORU Light Yellow 01/13/2025    CLARITYU Clear 01/13/2025    SPECGRAV 1.015 01/13/2025    PHUR 6.0 01/13/2025    LEUKOCYTESUR Negative 01/13/2025    NITRITE Negative 01/13/2025    PROTEIN UA Trace (A) 01/13/2025    GLUCOSEU Negative 01/13/2025    KETONESU Negative 01/13/2025    UROBILINOGEN  "<2.0 01/13/2025    BILIRUBINUR Negative 01/13/2025    BLOODU Negative 01/13/2025    RBCUA None Seen 01/13/2025    WBCUA None Seen 01/13/2025    EPIS None Seen 01/13/2025    BACTERIA None Seen 01/13/2025      No results found for: \"LABPROT\"  No results found for: \"MICROALBUR\", \"[id number]\"  Lab Results   Component Value Date    WBC 5.81 09/20/2024    HGB 11.9 (L) 01/13/2025    HCT 36.2 (L) 01/13/2025    MCV 83 09/20/2024     (L) 09/20/2024      Lab Results   Component Value Date    HGB 11.9 (L) 01/13/2025    HGB 11.5 (L) 09/20/2024    HGB 12.7 09/18/2023    HGB 13.3 05/22/2023    HGB 11.5 (L) 01/19/2020      No results found for: \"IRON\", \"TIBC\", \"FERRITIN\"   No results found for: \"PTHCALCIUM\", \"[id number]\", \"PHOSPHORUS\"   Lab Results   Component Value Date    CHOLESTEROL 176 09/20/2024    HDL 90 09/20/2024    LDLCALC 70 09/20/2024    TRIG 81 09/20/2024      No results found for: \"URICACID\"   Lab Results   Component Value Date    HGBA1C 5.6 09/20/2024      Lab Results   Component Value Date    FREET4 1.13 02/18/2019      No results found for: \"BETTINA\", \"DSDNAAB\", \"RFIGM\"   No results found for: \"PROT\", \"UPEP\", \"IMMUNOFIX\", \"KAPPALAMBDA\", \"KAPPALIGHT\"     Portions of the record may have been created with voice recognition software. Occasional wrong word or \"sound a like\" substitutions may have occurred due to the inherent limitations of voice recognition software. Read the chart carefully and recognize, using context, where substitutions have occurred. If you have any questions, please contact the dictating provider.      "

## 2025-01-24 NOTE — PATIENT INSTRUCTIONS
Start spirinolactone 25mg once a day. STOP potassium . Decrease hydrochlorthiazide to 25mg once a day in AM. Come back in 1 week for BP check, at that time.  After that will bring you back in 1 months.  Follow up blood work in 1.5 week.

## 2025-01-31 ENCOUNTER — CLINICAL SUPPORT (OUTPATIENT)
Dept: NEPHROLOGY | Facility: CLINIC | Age: 75
End: 2025-01-31

## 2025-01-31 VITALS — TEMPERATURE: 97.8 F | DIASTOLIC BLOOD PRESSURE: 90 MMHG | SYSTOLIC BLOOD PRESSURE: 164 MMHG

## 2025-01-31 DIAGNOSIS — R03.0 ELEVATED BLOOD PRESSURE READING: Primary | ICD-10-CM

## 2025-01-31 PROCEDURE — PBNCHG PB NO CHARGE PLACEHOLDER

## 2025-02-16 DIAGNOSIS — E26.9 HYPERALDOSTERONISM (HCC): ICD-10-CM

## 2025-02-17 RX ORDER — SPIRONOLACTONE 25 MG/1
25 TABLET ORAL DAILY
Qty: 90 TABLET | Refills: 1 | OUTPATIENT
Start: 2025-02-17

## 2025-03-24 ENCOUNTER — HOSPITAL ENCOUNTER (EMERGENCY)
Facility: HOSPITAL | Age: 75
Discharge: HOME | End: 2025-03-24
Attending: EMERGENCY MEDICINE
Payer: COMMERCIAL

## 2025-03-24 ENCOUNTER — APPOINTMENT (EMERGENCY)
Dept: RADIOLOGY | Facility: HOSPITAL | Age: 75
End: 2025-03-24
Payer: COMMERCIAL

## 2025-03-24 ENCOUNTER — APPOINTMENT (EMERGENCY)
Dept: CT IMAGING | Facility: HOSPITAL | Age: 75
End: 2025-03-24
Payer: COMMERCIAL

## 2025-03-24 ENCOUNTER — TELEPHONE (OUTPATIENT)
Age: 75
End: 2025-03-24

## 2025-03-24 ENCOUNTER — NURSE TRIAGE (OUTPATIENT)
Age: 75
End: 2025-03-24

## 2025-03-24 VITALS
TEMPERATURE: 97.8 F | DIASTOLIC BLOOD PRESSURE: 98 MMHG | OXYGEN SATURATION: 98 % | BODY MASS INDEX: 23.11 KG/M2 | RESPIRATION RATE: 19 BRPM | SYSTOLIC BLOOD PRESSURE: 184 MMHG | HEART RATE: 94 BPM | WEIGHT: 189.82 LBS

## 2025-03-24 DIAGNOSIS — J18.9 PNEUMONIA: ICD-10-CM

## 2025-03-24 DIAGNOSIS — R35.0 URINARY FREQUENCY: ICD-10-CM

## 2025-03-24 DIAGNOSIS — J44.1 COPD WITH ACUTE EXACERBATION (HCC): Primary | ICD-10-CM

## 2025-03-24 LAB
2HR DELTA HS TROPONIN: -1 NG/L
ALBUMIN SERPL BCG-MCNC: 4 G/DL (ref 3.5–5)
ALP SERPL-CCNC: 58 U/L (ref 34–104)
ALT SERPL W P-5'-P-CCNC: 11 U/L (ref 7–52)
ANION GAP SERPL CALCULATED.3IONS-SCNC: 11 MMOL/L (ref 4–13)
ANION GAP SERPL CALCULATED.3IONS-SCNC: 12 MMOL/L (ref 4–13)
AST SERPL W P-5'-P-CCNC: 15 U/L (ref 13–39)
ATRIAL RATE: 100 BPM
BASOPHILS # BLD AUTO: 0 THOUSANDS/ÂΜL (ref 0–0.1)
BASOPHILS NFR BLD AUTO: 0 % (ref 0–1)
BILIRUB SERPL-MCNC: 0.9 MG/DL (ref 0.2–1)
BNP SERPL-MCNC: 112 PG/ML (ref 0–100)
BUN SERPL-MCNC: 31 MG/DL (ref 5–25)
BUN SERPL-MCNC: 32 MG/DL (ref 5–25)
CALCIUM SERPL-MCNC: 8.4 MG/DL (ref 8.4–10.2)
CALCIUM SERPL-MCNC: 8.6 MG/DL (ref 8.4–10.2)
CARDIAC TROPONIN I PNL SERPL HS: 12 NG/L (ref ?–50)
CARDIAC TROPONIN I PNL SERPL HS: 13 NG/L (ref ?–50)
CHLORIDE SERPL-SCNC: 108 MMOL/L (ref 96–108)
CHLORIDE SERPL-SCNC: 109 MMOL/L (ref 96–108)
CO2 SERPL-SCNC: 21 MMOL/L (ref 21–32)
CO2 SERPL-SCNC: 22 MMOL/L (ref 21–32)
CREAT SERPL-MCNC: 2.03 MG/DL (ref 0.6–1.3)
CREAT SERPL-MCNC: 2.13 MG/DL (ref 0.6–1.3)
D DIMER PPP FEU-MCNC: 0.73 UG/ML FEU
EOSINOPHIL # BLD AUTO: 0 THOUSAND/ÂΜL (ref 0–0.61)
EOSINOPHIL NFR BLD AUTO: 0 % (ref 0–6)
ERYTHROCYTE [DISTWIDTH] IN BLOOD BY AUTOMATED COUNT: 14.2 % (ref 11.6–15.1)
FLUAV AG UPPER RESP QL IA.RAPID: NEGATIVE
FLUBV AG UPPER RESP QL IA.RAPID: NEGATIVE
GFR SERPL CREATININE-BSD FRML MDRD: 29 ML/MIN/1.73SQ M
GFR SERPL CREATININE-BSD FRML MDRD: 31 ML/MIN/1.73SQ M
GLUCOSE SERPL-MCNC: 105 MG/DL (ref 65–140)
GLUCOSE SERPL-MCNC: 118 MG/DL (ref 65–140)
HCT VFR BLD AUTO: 35.7 % (ref 36.5–49.3)
HGB BLD-MCNC: 12.4 G/DL (ref 12–17)
IMM GRANULOCYTES # BLD AUTO: 0.08 THOUSAND/UL (ref 0–0.2)
IMM GRANULOCYTES NFR BLD AUTO: 1 % (ref 0–2)
LACTATE SERPL-SCNC: 1.5 MMOL/L (ref 0.5–2)
LYMPHOCYTES # BLD AUTO: 0.65 THOUSANDS/ÂΜL (ref 0.6–4.47)
LYMPHOCYTES NFR BLD AUTO: 6 % (ref 14–44)
MAGNESIUM SERPL-MCNC: 1.8 MG/DL (ref 1.9–2.7)
MCH RBC QN AUTO: 29 PG (ref 26.8–34.3)
MCHC RBC AUTO-ENTMCNC: 34.7 G/DL (ref 31.4–37.4)
MCV RBC AUTO: 83 FL (ref 82–98)
MONOCYTES # BLD AUTO: 0.47 THOUSAND/ÂΜL (ref 0.17–1.22)
MONOCYTES NFR BLD AUTO: 5 % (ref 4–12)
NEUTROPHILS # BLD AUTO: 9.2 THOUSANDS/ÂΜL (ref 1.85–7.62)
NEUTS SEG NFR BLD AUTO: 88 % (ref 43–75)
NRBC BLD AUTO-RTO: 0 /100 WBCS
P AXIS: 76 DEGREES
PLATELET # BLD AUTO: 251 THOUSANDS/UL (ref 149–390)
PMV BLD AUTO: 10 FL (ref 8.9–12.7)
POTASSIUM SERPL-SCNC: 3.3 MMOL/L (ref 3.5–5.3)
POTASSIUM SERPL-SCNC: 3.4 MMOL/L (ref 3.5–5.3)
PR INTERVAL: 168 MS
PROCALCITONIN SERPL-MCNC: 0.07 NG/ML
PROT SERPL-MCNC: 6.5 G/DL (ref 6.4–8.4)
QRS AXIS: 62 DEGREES
QRSD INTERVAL: 80 MS
QT INTERVAL: 346 MS
QTC INTERVAL: 446 MS
RBC # BLD AUTO: 4.28 MILLION/UL (ref 3.88–5.62)
SARS-COV+SARS-COV-2 AG RESP QL IA.RAPID: NEGATIVE
SODIUM SERPL-SCNC: 141 MMOL/L (ref 135–147)
SODIUM SERPL-SCNC: 142 MMOL/L (ref 135–147)
T WAVE AXIS: 86 DEGREES
VENTRICULAR RATE: 100 BPM
WBC # BLD AUTO: 10.4 THOUSAND/UL (ref 4.31–10.16)

## 2025-03-24 PROCEDURE — 83735 ASSAY OF MAGNESIUM: CPT | Performed by: PHYSICIAN ASSISTANT

## 2025-03-24 PROCEDURE — 96361 HYDRATE IV INFUSION ADD-ON: CPT

## 2025-03-24 PROCEDURE — 87804 INFLUENZA ASSAY W/OPTIC: CPT | Performed by: PHYSICIAN ASSISTANT

## 2025-03-24 PROCEDURE — 85025 COMPLETE CBC W/AUTO DIFF WBC: CPT | Performed by: PHYSICIAN ASSISTANT

## 2025-03-24 PROCEDURE — 96374 THER/PROPH/DIAG INJ IV PUSH: CPT

## 2025-03-24 PROCEDURE — 80053 COMPREHEN METABOLIC PANEL: CPT | Performed by: PHYSICIAN ASSISTANT

## 2025-03-24 PROCEDURE — 93010 ELECTROCARDIOGRAM REPORT: CPT | Performed by: INTERNAL MEDICINE

## 2025-03-24 PROCEDURE — 85379 FIBRIN DEGRADATION QUANT: CPT | Performed by: PHYSICIAN ASSISTANT

## 2025-03-24 PROCEDURE — 94640 AIRWAY INHALATION TREATMENT: CPT

## 2025-03-24 PROCEDURE — 99285 EMERGENCY DEPT VISIT HI MDM: CPT | Performed by: PHYSICIAN ASSISTANT

## 2025-03-24 PROCEDURE — 36415 COLL VENOUS BLD VENIPUNCTURE: CPT | Performed by: PHYSICIAN ASSISTANT

## 2025-03-24 PROCEDURE — 99285 EMERGENCY DEPT VISIT HI MDM: CPT

## 2025-03-24 PROCEDURE — 87811 SARS-COV-2 COVID19 W/OPTIC: CPT | Performed by: PHYSICIAN ASSISTANT

## 2025-03-24 PROCEDURE — 71045 X-RAY EXAM CHEST 1 VIEW: CPT

## 2025-03-24 PROCEDURE — 80048 BASIC METABOLIC PNL TOTAL CA: CPT | Performed by: PHYSICIAN ASSISTANT

## 2025-03-24 PROCEDURE — 71275 CT ANGIOGRAPHY CHEST: CPT

## 2025-03-24 PROCEDURE — 84145 PROCALCITONIN (PCT): CPT | Performed by: PHYSICIAN ASSISTANT

## 2025-03-24 PROCEDURE — 84484 ASSAY OF TROPONIN QUANT: CPT | Performed by: PHYSICIAN ASSISTANT

## 2025-03-24 PROCEDURE — 83880 ASSAY OF NATRIURETIC PEPTIDE: CPT | Performed by: PHYSICIAN ASSISTANT

## 2025-03-24 PROCEDURE — 93005 ELECTROCARDIOGRAM TRACING: CPT

## 2025-03-24 PROCEDURE — 83605 ASSAY OF LACTIC ACID: CPT | Performed by: PHYSICIAN ASSISTANT

## 2025-03-24 RX ORDER — IPRATROPIUM BROMIDE AND ALBUTEROL SULFATE 2.5; .5 MG/3ML; MG/3ML
3 SOLUTION RESPIRATORY (INHALATION) ONCE
Status: COMPLETED | OUTPATIENT
Start: 2025-03-24 | End: 2025-03-24

## 2025-03-24 RX ORDER — POTASSIUM CHLORIDE 1500 MG/1
40 TABLET, EXTENDED RELEASE ORAL ONCE
Status: COMPLETED | OUTPATIENT
Start: 2025-03-24 | End: 2025-03-24

## 2025-03-24 RX ORDER — METHYLPREDNISOLONE SODIUM SUCCINATE 125 MG/2ML
125 INJECTION, POWDER, LYOPHILIZED, FOR SOLUTION INTRAMUSCULAR; INTRAVENOUS ONCE
Status: COMPLETED | OUTPATIENT
Start: 2025-03-24 | End: 2025-03-24

## 2025-03-24 RX ORDER — IPRATROPIUM BROMIDE AND ALBUTEROL SULFATE 2.5; .5 MG/3ML; MG/3ML
3 SOLUTION RESPIRATORY (INHALATION) 4 TIMES DAILY
Qty: 120 ML | Refills: 1 | Status: SHIPPED | OUTPATIENT
Start: 2025-03-24

## 2025-03-24 RX ORDER — LANOLIN ALCOHOL/MO/W.PET/CERES
800 CREAM (GRAM) TOPICAL ONCE
Status: COMPLETED | OUTPATIENT
Start: 2025-03-24 | End: 2025-03-24

## 2025-03-24 RX ORDER — PREDNISONE 20 MG/1
TABLET ORAL
Qty: 18 TABLET | Refills: 0 | Status: SHIPPED | OUTPATIENT
Start: 2025-03-24

## 2025-03-24 RX ORDER — AZITHROMYCIN 250 MG/1
TABLET, FILM COATED ORAL
Qty: 6 TABLET | Refills: 0 | Status: SHIPPED | OUTPATIENT
Start: 2025-03-24 | End: 2025-03-28

## 2025-03-24 RX ADMIN — Medication 800 MG: at 18:43

## 2025-03-24 RX ADMIN — IPRATROPIUM BROMIDE AND ALBUTEROL SULFATE 3 ML: 2.5; .5 SOLUTION RESPIRATORY (INHALATION) at 14:52

## 2025-03-24 RX ADMIN — METHYLPREDNISOLONE SODIUM SUCCINATE 125 MG: 125 INJECTION, POWDER, FOR SOLUTION INTRAMUSCULAR; INTRAVENOUS at 14:52

## 2025-03-24 RX ADMIN — IPRATROPIUM BROMIDE AND ALBUTEROL SULFATE 3 ML: .5; 3 SOLUTION RESPIRATORY (INHALATION) at 15:02

## 2025-03-24 RX ADMIN — IOHEXOL 85 ML: 350 INJECTION, SOLUTION INTRAVENOUS at 17:57

## 2025-03-24 RX ADMIN — POTASSIUM CHLORIDE 40 MEQ: 1500 TABLET, EXTENDED RELEASE ORAL at 18:43

## 2025-03-24 RX ADMIN — SODIUM CHLORIDE 500 ML: 0.9 INJECTION, SOLUTION INTRAVENOUS at 15:33

## 2025-03-24 RX ADMIN — IPRATROPIUM BROMIDE AND ALBUTEROL SULFATE 3 ML: 2.5; .5 SOLUTION RESPIRATORY (INHALATION) at 18:54

## 2025-03-24 NOTE — TELEPHONE ENCOUNTER
I called and spoke with Yovani. He is aware the provider is out of the office and he should follow up with Dr Escobar or seek medical attention at a local ER/Urgent Care. He verbalized an understanding.

## 2025-03-24 NOTE — ED PROVIDER NOTES
Time reflects when diagnosis was documented in both MDM as applicable and the Disposition within this note       Time User Action Codes Description Comment    3/24/2025  6:48 PM Karlos Mckenzie Add [J44.1] COPD with acute exacerbation (HCC)     3/24/2025  6:51 PM Karlos Mckenzie Add [R35.0] Urinary frequency     3/24/2025  6:56 PM Karlos Mckenzie Add [J18.9] Pneumonia           ED Disposition       ED Disposition   Discharge    Condition   Stable    Date/Time   Mon Mar 24, 2025  6:56 PM    Comment   Yovani Sánchez discharge to home/self care.                   Assessment & Plan       Medical Decision Making  74-year-old male here for valuation of shortness of breath.  Everyday smoker.  See HPI for further details.  Differential diagnosis includes pneumonia, acute coronary syndrome, pulmonary embolism, CHF, electrolyte disturbance, sepsis, acute kidney injury, COVID, influenza.    Amount and/or Complexity of Data Reviewed  Labs: ordered. Decision-making details documented in ED Course.  Radiology: ordered.    Risk  OTC drugs.  Prescription drug management.        ED Course as of 03/24/25 1858   Mon Mar 24, 2025   1504 WBC(!): 10.40   1504 Hemoglobin: 12.4   1504 Platelet Count: 251   1757 GFR, Calculated: 31       Medications   ipratropium-albuterol (DUO-NEB) 0.5-2.5 mg/3 mL inhalation solution 3 mL (3 mL Nebulization Given 3/24/25 1502)   ipratropium-albuterol (DUO-NEB) 0.5-2.5 mg/3 mL inhalation solution 3 mL (3 mL Nebulization Given 3/24/25 1452)   methylPREDNISolone sodium succinate (Solu-MEDROL) injection 125 mg (125 mg Intravenous Given 3/24/25 1452)   sodium chloride 0.9 % bolus 500 mL (0 mL Intravenous Stopped 3/24/25 1638)   iohexol (OMNIPAQUE) 350 MG/ML injection (MULTI-DOSE) 85 mL (85 mL Intravenous Given 3/24/25 1757)   potassium chloride (Klor-Con M20) CR tablet 40 mEq (40 mEq Oral Given 3/24/25 1843)   magnesium Oxide (MAG-OX) tablet 800 mg (800 mg Oral Given 3/24/25 1843)   ipratropium-albuterol  (DUO-NEB) 0.5-2.5 mg/3 mL inhalation solution 3 mL (3 mL Nebulization Given 3/24/25 1854)       ED Risk Strat Scores   HEART Risk Score      Flowsheet Row Most Recent Value   Heart Score Risk Calculator    History 0 Filed at: 03/24/2025 1449   ECG 0 Filed at: 03/24/2025 1449   Age 2 Filed at: 03/24/2025 1449   Risk Factors 2 Filed at: 03/24/2025 1449   Troponin 0 Filed at: 03/24/2025 1449   HEART Score 4 Filed at: 03/24/2025 1449          HEART Risk Score      Flowsheet Row Most Recent Value   Heart Score Risk Calculator    History 0 Filed at: 03/24/2025 1449   ECG 0 Filed at: 03/24/2025 1449   Age 2 Filed at: 03/24/2025 1449   Risk Factors 2 Filed at: 03/24/2025 1449   Troponin 0 Filed at: 03/24/2025 1449   HEART Score 4 Filed at: 03/24/2025 1449                              SBIRT 20yo+      Flowsheet Row Most Recent Value   Initial Alcohol Screen: US AUDIT-C     3a. Male UNDER 65: How often do you have five or more drinks on one occasion? 0 Filed at: 03/24/2025 1445   Audit-C Score 0 Filed at: 03/24/2025 1445                            History of Present Illness       Chief Complaint   Patient presents with    Shortness of Breath     4-5 days of SOB with cough history of asthma       Past Medical History:   Diagnosis Date    Asthma     COPD (chronic obstructive pulmonary disease) (HCC)     Diabetes mellitus (HCC)     Hyperlipidemia     Hypertension       Past Surgical History:   Procedure Laterality Date    SHOULDER ARTHROPLASTY Right     TOTAL HIP ARTHROPLASTY Right       Family History   Problem Relation Age of Onset    Breast cancer Mother     Hypertension Brother       Social History     Tobacco Use    Smoking status: Every Day     Current packs/day: 0.25     Average packs/day: 0.3 packs/day for 61.2 years (15.3 ttl pk-yrs)     Types: Cigars, Cigarettes     Start date: 1964    Smokeless tobacco: Never    Tobacco comments:     3 cigars  and pipe   Vaping Use    Vaping status: Never Used   Substance Use Topics     Alcohol use: Yes     Comment: occasional weekend use    Drug use: Never      E-Cigarette/Vaping    E-Cigarette Use Never User       E-Cigarette/Vaping Substances    Nicotine No     THC No     CBD No     Flavoring No     Other No       I have reviewed and agree with the history as documented.     This is a 74-year-old male presenting to the emergency department today for evaluation of cough that produces mucus as well as shortness of breath.  No chest pain no fevers no leg edema.  He has been out of his nebulizer solution.  He is a smoker.  He is currently in between physicians secondary to insurance changes.  He does exhibit some mild tachypnea upon initial evaluation here.      Shortness of Breath  Associated symptoms: cough and wheezing    Associated symptoms: no abdominal pain, no chest pain, no diaphoresis, no fever, no headaches, no neck pain, no rash, no sore throat and no vomiting        Review of Systems   Constitutional: Negative.  Negative for activity change, appetite change, chills, diaphoresis, fatigue, fever and unexpected weight change.   HENT: Negative.  Negative for sore throat, trouble swallowing and voice change.    Eyes: Negative.    Respiratory:  Positive for cough, shortness of breath and wheezing. Negative for chest tightness.    Cardiovascular: Negative.  Negative for chest pain, palpitations and leg swelling.   Gastrointestinal: Negative.  Negative for abdominal pain, blood in stool, nausea and vomiting.   Endocrine: Negative.    Genitourinary: Negative.  Negative for flank pain and hematuria.   Musculoskeletal: Negative.  Negative for arthralgias, back pain, gait problem, joint swelling, myalgias, neck pain and neck stiffness.   Skin: Negative.  Negative for rash and wound.   Allergic/Immunologic: Negative.    Neurological: Negative.  Negative for dizziness, seizures, syncope, weakness, light-headedness and headaches.   Hematological: Negative.    Psychiatric/Behavioral: Negative.      All other systems reviewed and are negative.          Objective       ED Triage Vitals   Temperature Pulse Blood Pressure Respirations SpO2 Patient Position - Orthostatic VS   03/24/25 1441 03/24/25 1441 03/24/25 1441 03/24/25 1441 03/24/25 1441 03/24/25 1441   97.8 °F (36.6 °C) (!) 118 (!) 197/100 20 96 % Sitting      Temp Source Heart Rate Source BP Location FiO2 (%) Pain Score    03/24/25 1441 03/24/25 1500 03/24/25 1441 -- 03/24/25 1441    Temporal Monitor Left arm  No Pain      Vitals      Date and Time Temp Pulse SpO2 Resp BP Pain Score FACES Pain Rating User   03/24/25 1845 -- 94 98 % 19 -- -- -- OP   03/24/25 1842 -- 72 97 % 20 -- -- -- SS   03/24/25 1630 -- 75 96 % 12 184/98 -- -- OP   03/24/25 1600 -- 87 95 % 21 154/96 -- -- OP   03/24/25 1530 -- 82 96 % 19 174/104 -- -- OP   03/24/25 1500 -- 78 98 % 12 168/99 -- -- OP   03/24/25 1441 97.8 °F (36.6 °C) 118 96 % 20 197/100 No Pain -- KTR            Physical Exam  Constitutional:       General: He is in acute distress.      Appearance: He is well-developed. He is ill-appearing and toxic-appearing. He is not diaphoretic.      Interventions: He is not intubated.  HENT:      Right Ear: External ear normal. No swelling. Tympanic membrane is not bulging.      Left Ear: External ear normal. No swelling. Tympanic membrane is not bulging.      Nose: Nose normal.      Mouth/Throat:      Pharynx: No oropharyngeal exudate.   Eyes:      General: Lids are normal.      Conjunctiva/sclera: Conjunctivae normal.      Pupils: Pupils are equal, round, and reactive to light.   Neck:      Thyroid: No thyromegaly.      Vascular: No JVD.      Trachea: No tracheal deviation.   Cardiovascular:      Rate and Rhythm: Normal rate and regular rhythm.      Pulses: Normal pulses.      Heart sounds: Normal heart sounds. No murmur heard.     No friction rub. No gallop.   Pulmonary:      Effort: Tachypnea and accessory muscle usage present. No bradypnea. He is not intubated.      Breath  sounds: No stridor. Decreased breath sounds, wheezing and rhonchi present. No rales.   Chest:      Chest wall: No tenderness.   Abdominal:      General: Bowel sounds are normal. There is no distension.      Palpations: Abdomen is soft. There is no mass.      Tenderness: There is no abdominal tenderness. There is no guarding or rebound.      Hernia: No hernia is present.   Musculoskeletal:         General: Normal range of motion.      Cervical back: Normal range of motion and neck supple. No edema. Normal range of motion.      Right lower leg: No tenderness. No edema.      Left lower leg: No tenderness. No edema.   Lymphadenopathy:      Cervical: No cervical adenopathy.   Skin:     General: Skin is warm and dry.      Coloration: Skin is not pale.      Findings: No erythema or rash.   Neurological:      Mental Status: He is alert and oriented to person, place, and time.      GCS: GCS eye subscore is 4. GCS verbal subscore is 5. GCS motor subscore is 6.      Cranial Nerves: No cranial nerve deficit.      Sensory: No sensory deficit.      Deep Tendon Reflexes: Reflexes are normal and symmetric.   Psychiatric:         Speech: Speech normal.         Behavior: Behavior normal.         Results Reviewed       Procedure Component Value Units Date/Time    HS Troponin I 2hr [[id number]]  (Normal) Collected: 03/24/25 1702    Lab Status: Final result Specimen: Blood from Hand, Left Updated: 03/24/25 1736     hs TnI 2hr 12 ng/L      Delta 2hr hsTnI -1 ng/L     Basic metabolic panel [[id number]]  (Abnormal) Collected: 03/24/25 1702    Lab Status: Final result Specimen: Blood from Hand, Left Updated: 03/24/25 1730     Sodium 141 mmol/L      Potassium 3.4 mmol/L      Chloride 109 mmol/L      CO2 21 mmol/L      ANION GAP 11 mmol/L      BUN 32 mg/dL      Creatinine 2.03 mg/dL      Glucose 118 mg/dL      Calcium 8.4 mg/dL      eGFR 31 ml/min/1.73sq m     Narrative:      National Kidney Disease Foundation guidelines for Chronic Kidney  Disease (CKD):     Stage 1 with normal or high GFR (GFR > 90 mL/min/1.73 square meters)    Stage 2 Mild CKD (GFR = 60-89 mL/min/1.73 square meters)    Stage 3A Moderate CKD (GFR = 45-59 mL/min/1.73 square meters)    Stage 3B Moderate CKD (GFR = 30-44 mL/min/1.73 square meters)    Stage 4 Severe CKD (GFR = 15-29 mL/min/1.73 square meters)    Stage 5 End Stage CKD (GFR <15 mL/min/1.73 square meters)  Note: GFR calculation is accurate only with a steady state creatinine    Procalcitonin [[id number]]  (Normal) Collected: 03/24/25 1452    Lab Status: Final result Specimen: Blood from Arm, Right Updated: 03/24/25 1526     Procalcitonin 0.07 ng/ml     HS Troponin 0hr (reflex protocol) [[id number]]  (Normal) Collected: 03/24/25 1452    Lab Status: Final result Specimen: Blood from Arm, Right Updated: 03/24/25 1523     hs TnI 0hr 13 ng/L     B-Type Natriuretic Peptide(BNP) [[id number]]  (Abnormal) Collected: 03/24/25 1452    Lab Status: Final result Specimen: Blood from Arm, Right Updated: 03/24/25 1523      pg/mL     FLU/COVID Rapid Antigen (30 min. TAT) - Preferred screening test in ED [[id number]]  (Normal) Collected: 03/24/25 1452    Lab Status: Final result Specimen: Nares from Nose Updated: 03/24/25 1517     SARS COV Rapid Antigen Negative     Influenza A Rapid Antigen Negative     Influenza B Rapid Antigen Negative    Narrative:      This test has been performed using the [employer] Angela 2 FLU+SARS Antigen test under the Emergency Use Authorization (EUA). This test has been validated by the  and verified by the performing laboratory. The Angela uses lateral flow immunofluorescent sandwich assay to detect SARS-COV, Influenza A and Influenza B Antigen.     The Quidel Angela 2 SARS Antigen test does not differentiate between SARS-CoV and SARS-CoV-2.     Negative results are presumptive and may be confirmed with a molecular assay, if necessary, for patient management. Negative results do not rule out SARS-CoV-2  or influenza infection and should not be used as the sole basis for treatment or patient management decisions. A negative test result may occur if the level of antigen in a sample is below the limit of detection of this test.     Positive results are indicative of the presence of viral antigens, but do not rule out bacterial infection or co-infection with other viruses.     All test results should be used as an adjunct to clinical observations and other information available to the provider.    FOR PEDIATRIC PATIENTS - copy/paste COVID Guidelines URL to browser: [email or web address]    Comprehensive metabolic panel [[id number]]  (Abnormal) Collected: 03/24/25 1452    Lab Status: Final result Specimen: Blood from Arm, Right Updated: 03/24/25 1516     Sodium 142 mmol/L      Potassium 3.3 mmol/L      Chloride 108 mmol/L      CO2 22 mmol/L      ANION GAP 12 mmol/L      BUN 31 mg/dL      Creatinine 2.13 mg/dL      Glucose 105 mg/dL      Calcium 8.6 mg/dL      AST 15 U/L      ALT 11 U/L      Alkaline Phosphatase 58 U/L      Total Protein 6.5 g/dL      Albumin 4.0 g/dL      Total Bilirubin 0.90 mg/dL      eGFR 29 ml/min/1.73sq m     Narrative:      National Kidney Disease Foundation guidelines for Chronic Kidney Disease (CKD):     Stage 1 with normal or high GFR (GFR > 90 mL/min/1.73 square meters)    Stage 2 Mild CKD (GFR = 60-89 mL/min/1.73 square meters)    Stage 3A Moderate CKD (GFR = 45-59 mL/min/1.73 square meters)    Stage 3B Moderate CKD (GFR = 30-44 mL/min/1.73 square meters)    Stage 4 Severe CKD (GFR = 15-29 mL/min/1.73 square meters)    Stage 5 End Stage CKD (GFR <15 mL/min/1.73 square meters)  Note: GFR calculation is accurate only with a steady state creatinine    Magnesium [[id number]]  (Abnormal) Collected: 03/24/25 1452    Lab Status: Final result Specimen: Blood from Arm, Right Updated: 03/24/25 1516     Magnesium 1.8 mg/dL     Lactic acid, plasma  (w/reflex if result > 2.0) [[id number]]  (Normal) Collected: 03/24/25 1452    Lab Status: Final result Specimen: Blood from Arm, Right Updated: 03/24/25 1514     LACTIC ACID 1.5 mmol/L     Narrative:      Result may be elevated if tourniquet was used during collection.    D-Dimer [[id number]]  (Abnormal) Collected: 03/24/25 1452    Lab Status: Final result Specimen: Blood from Arm, Right Updated: 03/24/25 1512     D-Dimer, Quant 0.73 ug/ml FEU     Narrative:      In the evaluation for possible pulmonary embolism, in the appropriate (Well's Score of 4 or less) patient, the age adjusted d-dimer cutoff for this patient can be calculated as:    Age x 0.01 (in ug/mL) for Age-adjusted D-dimer exclusion threshold for a patient over 50 years.    CBC and differential [[id number]]  (Abnormal) Collected: 03/24/25 1452    Lab Status: Final result Specimen: Blood from Arm, Right Updated: 03/24/25 1501     WBC 10.40 Thousand/uL      RBC 4.28 Million/uL      Hemoglobin 12.4 g/dL      Hematocrit 35.7 %      MCV 83 fL      MCH 29.0 pg      MCHC 34.7 g/dL      RDW 14.2 %      MPV 10.0 fL      Platelets 251 Thousands/uL      nRBC 0 /100 WBCs      Segmented % 88 %      Immature Grans % 1 %      Lymphocytes % 6 %      Monocytes % 5 %      Eosinophils Relative 0 %      Basophils Relative 0 %      Absolute Neutrophils 9.20 Thousands/µL      Absolute Immature Grans 0.08 Thousand/uL      Absolute Lymphocytes 0.65 Thousands/µL      Absolute Monocytes 0.47 Thousand/µL      Eosinophils Absolute 0.00 Thousand/µL      Basophils Absolute 0.00 Thousands/µL             CTA chest pe study   Final Interpretation by Tab Chambers MD (03/24 1852)      1.  No pulmonary embolism.   2.  Bilateral lower lobe bronchiolitis and left lower lobe pneumonia.   3.  Unchanged marked enlargement of the right thyroid lobe with no significant change in associated mass effect.                  Workstation performed: [id number]         XR chest 1 view portable   Final  Interpretation by Rinku Bartlett MD (03/24 1521)      No acute cardiopulmonary disease.      Right paratracheal mass consistent with goiter seen on prior CT and x-ray from 2023.            Resident: Rosendo Ayala I, the attending radiologist, have reviewed the images and agree with the final report above.      Workstation performed: [id number]             ECG 12 Lead Documentation Only    Date/Time: 3/24/2025 2:49 PM    Performed by: Karlos Mckenzie PA-C  Authorized by: Karlos Mckenzie PA-C    Indications / Diagnosis:  Sob  ECG reviewed by me, the ED Provider: yes    Patient location:  ED  Previous ECG:     Comparison to cardiac monitor: Yes    Interpretation:     Interpretation: normal    Rate:     ECG rate:  100    ECG rate assessment: normal    Rhythm:     Rhythm: sinus rhythm    Ectopy:     Ectopy: none    QRS:     QRS axis:  Normal    QRS intervals:  Normal  Conduction:     Conduction: normal    ST segments:     ST segments:  Normal  T waves:     T waves: normal        ED Medication and Procedure Management   Prior to Admission Medications   Prescriptions Last Dose Informant Patient Reported? Taking?   Fluticasone-Salmeterol (Advair Diskus) 500-50 mcg/dose inhaler   No No   Sig: Inhale 1 puff 2 (two) times a day Rinse mouth after use.   albuterol (PROVENTIL HFA,VENTOLIN HFA) 90 mcg/act inhaler   No No   Sig: Inhale 2 puffs 4 (four) times a day   amLODIPine (NORVASC) 10 mg tablet   No No   Sig: Take 1 tablet (10 mg total) by mouth daily   buPROPion (WELLBUTRIN SR) 150 mg 12 hr tablet   No No   Sig: Take 1 tablet (150 mg total) by mouth 2 (two) times a day   carbamide peroxide (DEBROX) 6.5 % otic solution   No No   Sig: Administer 5 drops into both ears 2 (two) times a day   cloNIDine (CATAPRES) 0.2 mg tablet   No No   Sig: Take 1 tablet (0.2 mg total) by mouth daily   cyclobenzaprine (FLEXERIL) 10 mg tablet  Self No No   Sig: Take 1 tablet (10 mg total) by mouth 3 (three) times a day as  needed for muscle spasms   dexamethasone (DECADRON) 1 mg tablet  Self Yes No   Sig: TAKE 1 TABLET BY MOUTH AS DIRECTED AT 11PM THE EVENING BEFORE YOUR 8AM BLOOD WORK THE NEXT DAY   diclofenac sodium (VOLTAREN) 1 %  Self Yes No   Sig: Apply 2 g topically 4 (four) times a day   gabapentin (NEURONTIN) 300 mg capsule   No No   Sig: Take 1 capsule (300 mg total) by mouth 2 (two) times a day   hydroCHLOROthiazide 25 mg tablet   No No   Sig: Take 1 tablet (25 mg total) by mouth daily   ipratropium-albuterol (DUO-NEB) 0.5-2.5 mg/3 mL nebulizer solution   No No   Sig: Take 3 mL by nebulization 4 (four) times a day   losartan (COZAAR) 100 MG tablet   No No   Sig: Take 1 tablet (100 mg total) by mouth daily   megestrol (MEGACE) 40 mg tablet   No No   Sig: Take 1 tablet (40 mg total) by mouth daily   mometasone (NASONEX) 50 mcg/act nasal spray   No No   Si sprays into each nostril daily   omeprazole (PriLOSEC) 20 mg delayed release capsule   No No   Sig: Take 1 capsule (20 mg total) by mouth 2 (two) times a day   predniSONE 20 mg tablet   Yes No   Sig: Take by mouth if needed   promethazine-dextromethorphan (PHENERGAN-DM) 6.25-15 mg/5 mL oral syrup   No No   Sig: Take 5 mL by mouth 4 (four) times a day as needed for cough   Patient not taking: Reported on 2025   simvastatin (ZOCOR) 20 mg tablet   No No   Sig: Take 1 tablet (20 mg total) by mouth daily at bedtime   spironolactone (ALDACTONE) 25 mg tablet   No No   Sig: Take 1 tablet (25 mg total) by mouth daily   tadalafil (CIALIS) 20 MG tablet   No No   Sig: Take 1 tablet (20 mg total) by mouth daily as needed for erectile dysfunction   terazosin (HYTRIN) 1 mg capsule   No No   Sig: Take 1 capsule (1 mg total) by mouth daily at bedtime   triamcinolone (KENALOG) 0.1 % lotion   No No   Sig: Apply topically 3 (three) times a day   zafirlukast (ACCOLATE) 20 MG tablet   No No   Sig: Take 1 tablet (20 mg total) by mouth 2 (two) times a day      Facility-Administered  Medications: None     Patient's Medications   Discharge Prescriptions    AZITHROMYCIN (ZITHROMAX Z-HANAN) 250 MG TABLET    Take 2 tablets today then 1 tablet daily x 4 days       Start Date: 3/24/2025 End Date: 3/28/2025       Order Dose: --       Quantity: 6 tablet    Refills: 0    IPRATROPIUM-ALBUTEROL (DUO-NEB) 0.5-2.5 MG/3 ML NEBULIZER SOLUTION    Take 3 mL by nebulization 4 (four) times a day       Start Date: 3/24/2025 End Date: --       Order Dose: 3 mL       Quantity: 120 mL    Refills: 1    PREDNISONE 20 MG TABLET    Take 3 tablets daily for 3 days followed by 2 tablets daily for 3 days followed by 1 tablet daily for 3 days       Start Date: 3/24/2025 End Date: --       Order Dose: --       Quantity: 18 tablet    Refills: 0       ED SEPSIS DOCUMENTATION   Time reflects when diagnosis was documented in both MDM as applicable and the Disposition within this note       Time User Action Codes Description Comment    3/24/2025  6:48 PM Karlos Mckenzie [J44.1] COPD with acute exacerbation (HCC)     3/24/2025  6:51 PM Karlos Mckenzie [R35.0] Urinary frequency     3/24/2025  6:56 PM Karlos Mckenzie [J18.9] Pneumonia                  Karlos Mckenzie PA-C  03/24/25 0317

## 2025-03-24 NOTE — TELEPHONE ENCOUNTER
"FOLLOW UP: please call back today     REASON FOR CONVERSATION: asthma attack     SYMPTOMS: SOB, Wheezing, speaking in phrases.     OTHER: needs refills on medications ASAP.    DISPOSITION: Go to ED/UCC Now (Or to Office with PCP Approval)      Patient calling stating he is out of his prednisone, albuterol, and his Duo-Neb, I informed him these were prescribed by his PCP Dr. Escobar and he told me \"taye Dr. Carney is now my PCP. He is requesting for a refill to be sent to the [facility] with a call to him when they are sent. He was SOB, speaking in phrases, and heard wheezing. I advised to the ED and he refused as he doesn't have the money to put out for a hospital visit. He said once he has his medications he will be fine. He has been dealing with this since the 80's.     Reason for Disposition   MODERATE asthma attack (e.g., SOB at rest, speaks in phrases, audible wheezes) and not resolved after 2 or 3 inhaler or nebulizer treatments given 20 minutes apart    Additional Information   Negative: MODERATE asthma attack (e.g., SOB at rest, speaks in phrases, audible wheezes) AND doesn't have neb or inhaler available    Protocols used: Asthma Attack-Adult-[place]    "

## 2025-03-25 ENCOUNTER — VBI (OUTPATIENT)
Dept: FAMILY MEDICINE CLINIC | Facility: CLINIC | Age: 75
End: 2025-03-25

## 2025-03-25 ENCOUNTER — TELEPHONE (OUTPATIENT)
Age: 75
End: 2025-03-25

## 2025-03-25 NOTE — TELEPHONE ENCOUNTER
03/25/25 2:22 PM    Patient contacted post ED visit, [facility] department spoke with patient/caregiver and outreach was successful.    Thank you.  Eden Mendez MA  [facility]

## 2025-03-25 NOTE — TELEPHONE ENCOUNTER
Arranged Cindy for 4/04/2025 appt and mailed waiver form to home address. Confirmed with Yovani that his cell phone is in working condition because he will received a txt with details of his ride prior to .

## 2025-03-25 NOTE — TELEPHONE ENCOUNTER
I called patient to schedule appointment, per referral. Patient is now scheduled 4/4/25 at 11:20 am with Dr Bello. Patient would like a lyft waiver mailed to his home address on file (confirmed) and lydave scheduled for appointment. Please advise.

## 2025-04-04 ENCOUNTER — CONSULT (OUTPATIENT)
Dept: PULMONOLOGY | Facility: CLINIC | Age: 75
End: 2025-04-04
Payer: COMMERCIAL

## 2025-04-04 VITALS
WEIGHT: 199 LBS | DIASTOLIC BLOOD PRESSURE: 81 MMHG | SYSTOLIC BLOOD PRESSURE: 160 MMHG | TEMPERATURE: 98.3 F | HEART RATE: 73 BPM | BODY MASS INDEX: 24.23 KG/M2 | OXYGEN SATURATION: 99 % | HEIGHT: 76 IN

## 2025-04-04 DIAGNOSIS — F17.211 CIGARETTE NICOTINE DEPENDENCE IN REMISSION: ICD-10-CM

## 2025-04-04 DIAGNOSIS — J45.40 MODERATE PERSISTENT ASTHMA WITHOUT COMPLICATION: ICD-10-CM

## 2025-04-04 DIAGNOSIS — R09.82 POST-NASAL DRIP: ICD-10-CM

## 2025-04-04 DIAGNOSIS — J18.9 PNEUMONIA OF BOTH LOWER LOBES DUE TO INFECTIOUS ORGANISM: Primary | ICD-10-CM

## 2025-04-04 DIAGNOSIS — J43.2 CENTRILOBULAR EMPHYSEMA (HCC): ICD-10-CM

## 2025-04-04 DIAGNOSIS — F17.290 CIGAR SMOKER: ICD-10-CM

## 2025-04-04 PROCEDURE — 99204 OFFICE O/P NEW MOD 45 MIN: CPT

## 2025-04-04 RX ORDER — TIOTROPIUM BROMIDE INHALATION SPRAY 3.12 UG/1
2 SPRAY, METERED RESPIRATORY (INHALATION) DAILY
Qty: 12 G | Refills: 1 | Status: SHIPPED | OUTPATIENT
Start: 2025-04-04

## 2025-04-04 RX ORDER — AZELASTINE 1 MG/ML
1 SPRAY, METERED NASAL 2 TIMES DAILY
Qty: 30 ML | Refills: 2 | Status: SHIPPED | OUTPATIENT
Start: 2025-04-04

## 2025-04-04 NOTE — PROGRESS NOTES
Consultation - Pulmonary Medicine   Yovani Sánchez 74 y.o. male MRN: [id number]    Physician Requesting Consult: Karlos Mckenzie PA-C  Reason for Consult: concern for COPD exacerbation  Yovani Sánchez is a 74 y.o. male with PMHx of emphysema, asthma, CKD 3, HTN, HLD,  DM 2 and tobacco use disorder who presents for pulmonary evaluation.    Centrilobular emphysema (HCC)  - The patient has moderate emphysematous changes on CT, suspect he may have asthma/COPD overlap syndrome, but no prior PFTs are available for review.  He is still smoking cigars and has a significant cigarette smoking history.  - Check A1A  - Check PFTs with BD and 6MWT  - Explained that he shouldn't take flovent on top of advair 500mcg.  Recommend stopping flovent, he can continue with advair 500mcg 1 puff BID and rinse out his mouth after each use.  - Will give sample of spiriva 2.5 mcg 2 puffs once daily and send prescription to his pharmacy.  Inhaler teaching was provided in the office today and patient was able to demonstrate good technique.  - Continue with albuterol MDI/nebs as needed.  - Follow up in 10-12 weeks.  -     Ambulatory Referral to Pulmonology  -     Alpha 1 Antitrypsin Phenotype; Future  -     Complete PFT with post Bronchodilator and Six Minute walk; Future  -     tiotropium (Spiriva Respimat) 2.5 MCG/ACT AERS inhaler; Inhale 2 puffs daily    Moderate persistent asthma without complication  - The patient reports a lifelong history of asthma and he likely has asthma/COPD overlap syndrome.  He main triggers are dust, smoke and animal dander.  - Inhaler therapy as above.  - Check CBC w/ diff and [facility] allergen panel, given continued symptoms on high-dose ICS/LABA he may benefit from biologics if not improving on triple therapy inhalers.  - Contiue with zafirlukast 20mg BID for allergy symptoms.  -     CBC and differential; Future  -     [facility] Allergy Panel, Adult; Future    Pneumonia of both lower lobes due to infectious organism  -  The patient was recently in the ED in bibasilar infiltrates and completed a course of antibiotics, he is now feeling improved from prior.  - Check follow up CT to assure resolution and evaluate for underlying abnormalities.  -     CT chest wo contrast; Future    Post-nasal drip  - Incomplete benefit with steroid nasal spray, will trial azelastine 1 spray per nostril twice daily.  -     azelastine (ASTELIN) 0.1 % nasal spray; 1 spray into each nostril 2 (two) times a day Use in each nostril as directed    Cigar smoker  Cigarette nicotine dependence in remission  - He has a roughly 40-45 year pack history and quit 10 years ago, but does continue to smoke cigars daily.  We discussed the importance of cessation of all forms of tobacco.  He does not want to consider addition of a medication at this time (already on bupropion).  - He qualifies for LDCT for the next 5 years, plan for repeat CT as above first.    Thank you for allowing me to participate in the care of your patient.  If there are any questions regarding evaluation please feel free to reach out.     Return in about 10 weeks (around 6/13/2025).  ______________________________________________________________________    HPI:    Yovani Sánchez is a 74 y.o. male with PMHx of emphysema, asthma, CKD 3, HTN, HLD,  DM 2 and tobacco use disorder who presents for pulmonary evaluation.  The patient was recently in the ED where he had a CTA PE study that showed no PE, but concern for LLL pneumonia on a background of mild emphysema.  He was given a Z-Florencio, DuoNebs and prednisone taper starting at 60 mg and decreasing by 20 mg every 3 days as well as referral to pulmonology.  He does report remotely following with a pulmonologist, and notes prior diagnosis of asthma, but is unsure if he's been told he has COPD.  He states at present he is taking Advair 500 mcg 1 puff twice daily on top of Flovent 2 puffs twice daily of unknown dosing, but is rinsing his mouth after all ICS  inhalers.  He has an albuterol MDI which she is using roughly once a week and DuoNebs which she states he is using twice a month.  He thinks he is received 1 course of prednisone in the last year and notes that he often has respiratory illness at least once a year.  He does have dyspnea on exertion with inclines and stairs although has no limit on flat ground and believes he can do 2 flights of steps for needing to rest.  He does have seasonal allergies for which she is on zafirlukast 20 mg twice daily with good benefit.  He notes postnasal drip that is only partially relieved by use of Nasonex.  He does have a chronic cough that is productive of yellow sputum and states it was brown prior to antibiotics recently for pneumonia, but has improved.  Overall he does feel almost back to baseline since his recent infection.  He denies hemoptysis, chest pain, palpitations, pedal edema, syncope, unintentional weight loss and night sweats.    He states that he was diagnosed with asthma as a young child and had 3-4 episodes of bronchitis per year when he was younger.  He also had multiple hospitalizations for his asthma.  He does state he was intubated once 30 years ago when he had the flu.    His asthma triggers include dust, smoke and animal dander.    He states he is taking his advair 1 puff twice daily on top of flovent 2 puffs BID (unknown)    Tobacco/Vaping: started cigarettes at 15yo, quit cigarettes around 65yo, max was 1 PPD.  He currently smokes 2-4 cigars in a day currently and this overlapped with his cigarette smoking.  He denies vaping/e-cigarettes or chewing tobacco.  He states he's not ready quit.  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    Current Medications:    Current Outpatient Medications:     azelastine (ASTELIN) 0.1 % nasal spray, 1 spray into each nostril 2 (two) times a day Use in each nostril as directed, Disp: 30 mL, Rfl: 2    tiotropium (Spiriva Respimat) 2.5 MCG/ACT AERS inhaler, Inhale 2 puffs daily, Disp: 12 g, Rfl: 1    albuterol (PROVENTIL HFA,VENTOLIN HFA) 90 mcg/act inhaler, Inhale 2 puffs 4 (four) times a day, Disp: 54 g, Rfl: 1    amLODIPine (NORVASC) 10 mg tablet, Take 1 tablet (10 mg total) by mouth daily, Disp: 90 tablet, Rfl: 1    buPROPion (WELLBUTRIN SR) 150 mg 12 hr tablet, Take 1 tablet (150 mg total) by mouth 2 (two) times a day, Disp: 180 tablet, Rfl: 1    carbamide peroxide (DEBROX) 6.5 % otic solution, Administer 5 drops into both ears 2 (two) times a day, Disp: 15 mL, Rfl: 2    cloNIDine (CATAPRES) 0.2 mg tablet, Take 1 tablet (0.2 mg total) by mouth daily, Disp: 90 tablet, Rfl: 1    cyclobenzaprine (FLEXERIL) 10 mg tablet, Take 1 tablet (10 mg total) by mouth 3 (three) times a day as needed for muscle spasms, Disp: 30 tablet, Rfl: 0    dexamethasone (DECADRON) 1 mg tablet, TAKE 1 TABLET BY MOUTH AS DIRECTED AT 11PM THE EVENING BEFORE YOUR 8AM BLOOD WORK THE NEXT DAY, Disp: , Rfl:     diclofenac sodium (VOLTAREN) 1 %, Apply 2 g topically 4 (four) times a day, Disp: , Rfl:     Fluticasone-Salmeterol (Advair Diskus) 500-50 mcg/dose inhaler, Inhale 1 puff 2 (two) times a day Rinse mouth after use., Disp: 180 blister, Rfl: 1    gabapentin (NEURONTIN) 300 mg capsule, Take 1 capsule (300 mg total) by mouth 2 (two) times a day, Disp: 180 capsule, Rfl: 1    hydroCHLOROthiazide 25 mg tablet, Take 1 tablet (25 mg  total) by mouth daily, Disp: 180 tablet, Rfl: 1    ipratropium-albuterol (DUO-NEB) 0.5-2.5 mg/3 mL nebulizer solution, Take 3 mL by nebulization 4 (four) times a day, Disp: 360 mL, Rfl: 5    ipratropium-albuterol (DUO-NEB) 0.5-2.5 mg/3 mL nebulizer solution, Take 3 mL by nebulization 4 (four) times a day, Disp: 120 mL, Rfl: 1    losartan (COZAAR) 100 MG tablet, Take 1 tablet (100 mg total) by mouth daily, Disp: 90 tablet, Rfl: 1    megestrol (MEGACE) 40 mg tablet, Take 1 tablet (40 mg total) by mouth daily, Disp: 90 tablet, Rfl: 1    mometasone (NASONEX) 50 mcg/act nasal spray, 2 sprays into each nostril daily, Disp: 51 g, Rfl: 1    omeprazole (PriLOSEC) 20 mg delayed release capsule, Take 1 capsule (20 mg total) by mouth 2 (two) times a day, Disp: 180 capsule, Rfl: 1    predniSONE 20 mg tablet, Take by mouth if needed, Disp: , Rfl:     predniSONE 20 mg tablet, Take 3 tablets daily for 3 days followed by 2 tablets daily for 3 days followed by 1 tablet daily for 3 days, Disp: 18 tablet, Rfl: 0    promethazine-dextromethorphan (PHENERGAN-DM) 6.25-15 mg/5 mL oral syrup, Take 5 mL by mouth 4 (four) times a day as needed for cough (Patient not taking: Reported on 1/24/2025), Disp: 180 mL, Rfl: 0    simvastatin (ZOCOR) 20 mg tablet, Take 1 tablet (20 mg total) by mouth daily at bedtime, Disp: 90 tablet, Rfl: 1    spironolactone (ALDACTONE) 25 mg tablet, Take 1 tablet (25 mg total) by mouth daily, Disp: 30 tablet, Rfl: 1    tadalafil (CIALIS) 20 MG tablet, Take 1 tablet (20 mg total) by mouth daily as needed for erectile dysfunction, Disp: 30 tablet, Rfl: 5    terazosin (HYTRIN) 1 mg capsule, Take 1 capsule (1 mg total) by mouth daily at bedtime, Disp: 90 capsule, Rfl: 1    triamcinolone (KENALOG) 0.1 % lotion, Apply topically 3 (three) times a day, Disp: 60 mL, Rfl: 0    zafirlukast (ACCOLATE) 20 MG tablet, Take 1 tablet (20 mg total) by mouth 2 (two) times a day, Disp: 180 tablet, Rfl: 1    Historical Information   Past  "Medical History:   Diagnosis Date    Asthma     COPD (chronic obstructive pulmonary disease) (HCC)     Diabetes mellitus (HCC)     Hyperlipidemia     Hypertension      Past Surgical History:   Procedure Laterality Date    SHOULDER ARTHROPLASTY Right     TOTAL HIP ARTHROPLASTY Right    Social History   Social History     Tobacco Use   Smoking Status Every Day    Current packs/day: 0.25    Average packs/day: 0.3 packs/day for 61.3 years (15.3 ttl pk-yrs)    Types: Cigars, Cigarettes    Start date: 1964   Smokeless Tobacco Never   Tobacco Comments    3 cigars  and pipe     Family History:   Family History   Problem Relation Age of Onset    Breast cancer Mother     Hypertension Brother      PhysicalExamination:  Vitals:   /81 (BP Location: Left arm, Patient Position: Sitting, Cuff Size: Adult)   Pulse 73   Temp 98.3 °F (36.8 °C) (Temporal)   Ht 6' 4\" (1.93 m)   Wt 90.3 kg (199 lb)   SpO2 99%   BMI 24.22 kg/m²   Body mass index is 24.22 kg/m².    Constitutional: NAD, on room air, no conversational dyspnea   Skin: Warm, dry, no rashes noted   Eyes: PERRL, normal conjunctiva  ENT: Nasal congestion present, moist mucus membranes.  Neck: No JVD, trachea is midline, no adenopathy.  Resp: distant breath sounds, but no W/R/R   Cardiac: RRR, +S1/S2, no M/R/G  Extremities: No digital clubbing or pedal edema  Neuro: AAOx3    Diagnostic Data:  Labs:  I personally reviewed the most recent laboratory data pertinent to today's visit    Lab Results   Component Value Date    WBC 10.40 (H) 03/24/2025    HGB 12.4 03/24/2025    HCT 35.7 (L) 03/24/2025    MCV 83 03/24/2025     03/24/2025     Lab Results   Component Value Date    CALCIUM 8.4 03/24/2025    K 3.4 (L) 03/24/2025    CO2 21 03/24/2025     (H) 03/24/2025    BUN 32 (H) 03/24/2025    CREATININE 2.03 (H) 03/24/2025     No results found for: \"IGE\"  Lab Results   Component Value Date    ALT 11 03/24/2025    AST 15 03/24/2025    ALKPHOS 58 03/24/2025     PFT " "results:  None    Imaging:  I personally reviewed the images in PACS pertinent to today's visit:  CTA PE Study -- 3/24/2025  1.  No pulmonary embolism.  2.  Bilateral lower lobe bronchiolitis and left lower lobe pneumonia.  3.  Unchanged marked enlargement of the right thyroid lobe with no significant change in associated mass effect.    Other studies:  None      Lacey Bello MD  Pulmonary-Critical Care and Sleep Medicine  04/04/25    Portions of the record may have been created with voice recognition software. Occasional wrong word or \"sound a like\" substitutions may have occurred due to the inherent limitations of voice recognition software. Please read the chart carefully and recognize, using context, where substitutions have occurred.  "

## 2025-04-04 NOTE — PATIENT INSTRUCTIONS
- Stop taking flovent inhaler (orange inhaler)  - Continue advair inhaler 1 puff twice daily and rinse out mouth after each use  - Start sample of spiriva inhaler 2 puffs once daily, a script was also sent to your pharmacy  - When you're ready to quit smoking cigars please call the office to discuss medication options.  - Script for azelastine nasal spray take 1 spray per nostril twice daily  - Lung function testing  - CT chest around mid May  - Follow up in 10 weeks

## 2025-05-19 ENCOUNTER — HOSPITAL ENCOUNTER (OUTPATIENT)
Dept: PULMONOLOGY | Facility: HOSPITAL | Age: 75
Discharge: HOME | End: 2025-05-19

## 2025-05-19 RX ORDER — ALBUTEROL SULFATE 0.83 MG/ML
2.5 SOLUTION RESPIRATORY (INHALATION) ONCE AS NEEDED
Status: DISCONTINUED | OUTPATIENT
Start: 2025-05-19 | End: 2025-05-23 | Stop reason: HOSPADM

## 2025-07-16 ENCOUNTER — TELEPHONE (OUTPATIENT)
Age: 75
End: 2025-07-16

## 2025-07-16 NOTE — TELEPHONE ENCOUNTER
Spoke to staff at [facility]; advised patient last seen 08/2024 by PCP. Unable to confirm medication list. Advised patient needed an appointment with PCP. Scheduled 07/31/2025 OV with Patient over the phone from [facility]ist. Dentist will provide patient with an antibiotic, but will need an updated medication list from PCP and have patient aware if he needs to hold any medications prior to his extraction appointment.

## 2025-07-16 NOTE — TELEPHONE ENCOUNTER
[facility] reports they need an updated med list faxed to them and if any medications need to be held. Fax# [phone]. Pt is having yuliana extraction done. Office staff please call them back @[phone], to make them aware of any meds to be held.  Thank you

## 2025-07-17 ENCOUNTER — OFFICE VISIT (OUTPATIENT)
Dept: FAMILY MEDICINE CLINIC | Facility: CLINIC | Age: 75
End: 2025-07-17
Payer: COMMERCIAL

## 2025-07-17 VITALS
HEIGHT: 76 IN | SYSTOLIC BLOOD PRESSURE: 134 MMHG | HEART RATE: 84 BPM | DIASTOLIC BLOOD PRESSURE: 84 MMHG | RESPIRATION RATE: 20 BRPM | BODY MASS INDEX: 23.99 KG/M2 | TEMPERATURE: 98.4 F | WEIGHT: 197 LBS

## 2025-07-17 DIAGNOSIS — E11.9 TYPE 2 DIABETES MELLITUS WITHOUT COMPLICATION, WITHOUT LONG-TERM CURRENT USE OF INSULIN (HCC): ICD-10-CM

## 2025-07-17 DIAGNOSIS — J44.9 CHRONIC OBSTRUCTIVE PULMONARY DISEASE, UNSPECIFIED COPD TYPE (HCC): ICD-10-CM

## 2025-07-17 DIAGNOSIS — Z01.818 PREOPERATIVE CLEARANCE: Primary | ICD-10-CM

## 2025-07-17 DIAGNOSIS — N18.32 STAGE 3B CHRONIC KIDNEY DISEASE (HCC): ICD-10-CM

## 2025-07-17 DIAGNOSIS — J45.40 MODERATE PERSISTENT ASTHMA WITHOUT COMPLICATION: ICD-10-CM

## 2025-07-17 DIAGNOSIS — Z87.19 HISTORY OF DENTAL PROBLEMS: ICD-10-CM

## 2025-07-17 PROCEDURE — 99214 OFFICE O/P EST MOD 30 MIN: CPT | Performed by: FAMILY MEDICINE

## 2025-07-17 PROCEDURE — G2211 COMPLEX E/M VISIT ADD ON: HCPCS | Performed by: FAMILY MEDICINE

## 2025-07-17 RX ORDER — AMOXICILLIN 500 MG/1
500 CAPSULE ORAL EVERY 8 HOURS SCHEDULED
COMMUNITY
Start: 2025-07-16 | End: 2025-07-31

## 2025-07-17 NOTE — PROGRESS NOTES
"Name: Yovani Sánchez      : 1950      MRN: [id number]  Encounter Provider: Yovani Escobar DO  Encounter Date: 2025   Encounter department: [facility] PRIMARY CARE  :  Assessment & Plan           History of Present Illness   HPI  Review of Systems    Objective   /84   Pulse 84   Temp 98.4 °F (36.9 °C)   Resp 20   Ht 6' 4\" (1.93 m)   Wt 89.4 kg (197 lb)   BMI 23.98 kg/m²      Physical Exam    "

## 2025-07-17 NOTE — TELEPHONE ENCOUNTER
Pt called in needing address to the office.     Address provided to pt. Pt is in the area, thought appt was for 12:45 PM. Informed pt it is for 1:45 PM.

## 2025-07-17 NOTE — ASSESSMENT & PLAN NOTE
Lab Results   Component Value Date    EGFR 31 03/24/2025    EGFR 29 03/24/2025    EGFR 35 01/13/2025    CREATININE 2.03 (H) 03/24/2025    CREATININE 2.13 (H) 03/24/2025    CREATININE 1.86 (H) 01/13/2025   As noted

## 2025-07-17 NOTE — PROGRESS NOTES
"Name: Yovani Sánchez      : 1950      MRN: [id number]  Encounter Provider: Yovani Escobar   Encounter Date: 2025   Encounter department: [facility] PRIMARY CARE  :  Assessment & Plan  Preoperative clearance  The patient is cleared for dental extraction.  The patient is not on an anticoagulant.       History of dental problems  Dental pain into the lower teeth that are to be extracted       Type 2 diabetes mellitus without complication, without long-term current use of insulin (HCC)  The patient states that her sugar routinely is under 150  Lab Results   Component Value Date    HGBA1C 5.6 2024            Moderate persistent asthma without complication  Patient uses Spiriva and albuterol on an as needed basis       Chronic obstructive pulmonary disease, unspecified COPD type ([facility])  As above       Stage 3b chronic kidney disease (HCC)  Lab Results   Component Value Date    EGFR 31 2025    EGFR 29 2025    EGFR 35 2025    CREATININE 2.03 (H) 2025    CREATININE 2.13 (H) 2025    CREATININE 1.86 (H) 2025   As noted                History of Present Illness   Pre-op Exam    Pre-operative Risk Factors:    History of cerebrovascular disease: No    History of ischemic heart disease: No  Pre-operative treatment with insulin: No  Pre-operative creatinine >2 mg/dL: Yes  History of congestive heart failure: No  Dental Pain     Medication Refill      Review of Systems    Objective   /84   Pulse 84   Temp 98.4 °F (36.9 °C)   Resp 20   Ht 6' 4\" (1.93 m)   Wt 89.4 kg (197 lb)   BMI 23.98 kg/m²      Physical Exam  Constitutional:       Appearance: Normal appearance.   HENT:      Head: Normocephalic and atraumatic.      Right Ear: Tympanic membrane, ear canal and external ear normal.      Left Ear: Tympanic membrane, ear canal and external ear normal.      Nose: Nose normal.      Mouth/Throat:      Mouth: Mucous membranes are moist.      Pharynx: " Oropharynx is clear.        Comments: 2 teeth to be extracted    Eyes:      Extraocular Movements: Extraocular movements intact.      Conjunctiva/sclera: Conjunctivae normal.      Pupils: Pupils are equal, round, and reactive to light.       Cardiovascular:      Rate and Rhythm: Normal rate and regular rhythm.      Pulses: Normal pulses.      Heart sounds: Normal heart sounds.   Pulmonary:      Effort: Pulmonary effort is normal.      Breath sounds: Normal breath sounds.   Abdominal:      General: Abdomen is flat. Bowel sounds are normal.      Palpations: Abdomen is soft.     Musculoskeletal:         General: Normal range of motion.      Cervical back: Normal range of motion.     Skin:     General: Skin is warm and dry.      Capillary Refill: Capillary refill takes less than 2 seconds.     Neurological:      General: No focal deficit present.      Mental Status: He is alert and oriented to person, place, and time.     Psychiatric:         Mood and Affect: Mood normal.         Behavior: Behavior normal.

## 2025-07-31 ENCOUNTER — OFFICE VISIT (OUTPATIENT)
Dept: FAMILY MEDICINE CLINIC | Facility: CLINIC | Age: 75
End: 2025-07-31
Payer: COMMERCIAL

## 2025-07-31 VITALS
DIASTOLIC BLOOD PRESSURE: 84 MMHG | BODY MASS INDEX: 23.53 KG/M2 | HEIGHT: 76 IN | TEMPERATURE: 97.4 F | SYSTOLIC BLOOD PRESSURE: 130 MMHG | OXYGEN SATURATION: 99 % | HEART RATE: 81 BPM | WEIGHT: 193.2 LBS

## 2025-07-31 DIAGNOSIS — Z72.0 TOBACCO ABUSE: ICD-10-CM

## 2025-07-31 DIAGNOSIS — J45.40 MODERATE PERSISTENT ASTHMA WITHOUT COMPLICATION: ICD-10-CM

## 2025-07-31 DIAGNOSIS — R63.0 ANOREXIA: ICD-10-CM

## 2025-07-31 DIAGNOSIS — J43.2 CENTRILOBULAR EMPHYSEMA (HCC): ICD-10-CM

## 2025-07-31 DIAGNOSIS — M79.621 PAIN OF RIGHT UPPER ARM: ICD-10-CM

## 2025-07-31 DIAGNOSIS — I12.9 HYPERTENSIVE CHRONIC KIDNEY DISEASE WITH STAGE 1 THROUGH STAGE 4 CHRONIC KIDNEY DISEASE, OR UNSPECIFIED CHRONIC KIDNEY DISEASE: ICD-10-CM

## 2025-07-31 DIAGNOSIS — Z87.19 HISTORY OF DENTAL PROBLEMS: ICD-10-CM

## 2025-07-31 DIAGNOSIS — E26.9 HYPERALDOSTERONISM (HCC): ICD-10-CM

## 2025-07-31 DIAGNOSIS — E78.5 DYSLIPIDEMIA: ICD-10-CM

## 2025-07-31 DIAGNOSIS — N52.9 ERECTILE DYSFUNCTION, UNSPECIFIED ERECTILE DYSFUNCTION TYPE: ICD-10-CM

## 2025-07-31 DIAGNOSIS — I10 PRIMARY HYPERTENSION: ICD-10-CM

## 2025-07-31 DIAGNOSIS — N18.4 CHRONIC KIDNEY DISEASE, STAGE 4 (SEVERE) (HCC): ICD-10-CM

## 2025-07-31 DIAGNOSIS — K21.9 GERD WITHOUT ESOPHAGITIS: ICD-10-CM

## 2025-07-31 DIAGNOSIS — J30.0 VASOMOTOR RHINITIS: ICD-10-CM

## 2025-07-31 DIAGNOSIS — E11.9 TYPE 2 DIABETES MELLITUS WITHOUT COMPLICATION, WITHOUT LONG-TERM CURRENT USE OF INSULIN (HCC): Primary | ICD-10-CM

## 2025-07-31 PROCEDURE — 99215 OFFICE O/P EST HI 40 MIN: CPT | Performed by: FAMILY MEDICINE

## 2025-07-31 RX ORDER — CYCLOBENZAPRINE HCL 10 MG
10 TABLET ORAL 3 TIMES DAILY PRN
Qty: 30 TABLET | Refills: 0 | Status: SHIPPED | OUTPATIENT
Start: 2025-07-31

## 2025-07-31 RX ORDER — AMLODIPINE BESYLATE 10 MG/1
10 TABLET ORAL DAILY
Qty: 90 TABLET | Refills: 1 | Status: SHIPPED | OUTPATIENT
Start: 2025-07-31

## 2025-07-31 RX ORDER — MEGESTROL ACETATE 40 MG/1
40 TABLET ORAL DAILY
Qty: 90 TABLET | Refills: 1 | Status: SHIPPED | OUTPATIENT
Start: 2025-07-31

## 2025-07-31 RX ORDER — TERAZOSIN 1 MG/1
1 CAPSULE ORAL
Qty: 90 CAPSULE | Refills: 1 | Status: SHIPPED | OUTPATIENT
Start: 2025-07-31

## 2025-07-31 RX ORDER — BUPROPION HYDROCHLORIDE 150 MG/1
150 TABLET, EXTENDED RELEASE ORAL 2 TIMES DAILY
Qty: 180 TABLET | Refills: 1 | Status: SHIPPED | OUTPATIENT
Start: 2025-07-31

## 2025-07-31 RX ORDER — IPRATROPIUM BROMIDE AND ALBUTEROL SULFATE 2.5; .5 MG/3ML; MG/3ML
3 SOLUTION RESPIRATORY (INHALATION) 4 TIMES DAILY
Qty: 120 ML | Refills: 1 | Status: SHIPPED | OUTPATIENT
Start: 2025-07-31

## 2025-07-31 RX ORDER — SIMVASTATIN 20 MG
20 TABLET ORAL
Qty: 90 TABLET | Refills: 1 | Status: SHIPPED | OUTPATIENT
Start: 2025-07-31

## 2025-07-31 RX ORDER — GABAPENTIN 300 MG/1
300 CAPSULE ORAL 2 TIMES DAILY
Qty: 180 CAPSULE | Refills: 1 | Status: SHIPPED | OUTPATIENT
Start: 2025-07-31

## 2025-07-31 RX ORDER — CLONIDINE HYDROCHLORIDE 0.2 MG/1
0.2 TABLET ORAL DAILY
Qty: 90 TABLET | Refills: 1 | Status: SHIPPED | OUTPATIENT
Start: 2025-07-31

## 2025-07-31 RX ORDER — SPIRONOLACTONE 25 MG/1
25 TABLET ORAL DAILY
Qty: 30 TABLET | Refills: 1 | Status: SHIPPED | OUTPATIENT
Start: 2025-07-31

## 2025-07-31 RX ORDER — FLUTICASONE PROPIONATE AND SALMETEROL 500; 50 UG/1; UG/1
1 POWDER RESPIRATORY (INHALATION) 2 TIMES DAILY
Qty: 180 BLISTER | Refills: 1 | Status: SHIPPED | OUTPATIENT
Start: 2025-07-31

## 2025-07-31 RX ORDER — LOSARTAN POTASSIUM 100 MG/1
100 TABLET ORAL DAILY
Qty: 90 TABLET | Refills: 1 | Status: SHIPPED | OUTPATIENT
Start: 2025-07-31

## 2025-07-31 RX ORDER — TIOTROPIUM BROMIDE INHALATION SPRAY 3.12 UG/1
2 SPRAY, METERED RESPIRATORY (INHALATION) DAILY
Qty: 12 G | Refills: 1 | Status: SHIPPED | OUTPATIENT
Start: 2025-07-31

## 2025-07-31 RX ORDER — ALBUTEROL SULFATE 90 UG/1
2 INHALANT RESPIRATORY (INHALATION) 4 TIMES DAILY
Qty: 54 G | Refills: 1 | Status: SHIPPED | OUTPATIENT
Start: 2025-07-31

## 2025-07-31 RX ORDER — ZAFIRLUKAST 20 MG/1
20 TABLET, FILM COATED ORAL 2 TIMES DAILY
Qty: 180 TABLET | Refills: 1 | Status: SHIPPED | OUTPATIENT
Start: 2025-07-31

## 2025-07-31 RX ORDER — DEXAMETHASONE 1 MG
1 TABLET ORAL
Qty: 90 TABLET | Refills: 0 | Status: SHIPPED | OUTPATIENT
Start: 2025-07-31

## 2025-07-31 RX ORDER — MOMETASONE FUROATE MONOHYDRATE 50 UG/1
2 SPRAY, METERED NASAL DAILY
Qty: 51 G | Refills: 1 | Status: SHIPPED | OUTPATIENT
Start: 2025-07-31

## 2025-07-31 RX ORDER — OMEPRAZOLE 20 MG/1
20 CAPSULE, DELAYED RELEASE ORAL 2 TIMES DAILY
Qty: 180 CAPSULE | Refills: 1 | Status: SHIPPED | OUTPATIENT
Start: 2025-07-31

## 2025-07-31 RX ORDER — PREDNISONE 20 MG/1
TABLET ORAL
Qty: 18 TABLET | Refills: 0 | Status: SHIPPED | OUTPATIENT
Start: 2025-07-31

## 2025-07-31 RX ORDER — HYDROCHLOROTHIAZIDE 25 MG/1
25 TABLET ORAL DAILY
Qty: 180 TABLET | Refills: 1 | Status: SHIPPED | OUTPATIENT
Start: 2025-07-31

## 2025-08-19 ENCOUNTER — TELEPHONE (OUTPATIENT)
Dept: FAMILY MEDICINE CLINIC | Facility: CLINIC | Age: 75
End: 2025-08-19